# Patient Record
Sex: FEMALE | Race: WHITE | NOT HISPANIC OR LATINO | Employment: FULL TIME | URBAN - METROPOLITAN AREA
[De-identification: names, ages, dates, MRNs, and addresses within clinical notes are randomized per-mention and may not be internally consistent; named-entity substitution may affect disease eponyms.]

---

## 2017-01-09 ENCOUNTER — APPOINTMENT (OUTPATIENT)
Dept: GENERAL RADIOLOGY | Facility: HOSPITAL | Age: 52
End: 2017-01-09
Attending: FAMILY MEDICINE

## 2017-01-16 ENCOUNTER — HOSPITAL ENCOUNTER (OUTPATIENT)
Dept: GENERAL RADIOLOGY | Facility: HOSPITAL | Age: 52
Discharge: HOME | End: 2017-01-16
Attending: FAMILY MEDICINE | Admitting: FAMILY MEDICINE

## 2017-01-16 DIAGNOSIS — M25.511 ACUTE PAIN OF RIGHT SHOULDER: ICD-10-CM

## 2017-01-16 PROCEDURE — 73030 X-RAY EXAM OF SHOULDER: CPT

## 2017-11-06 ENCOUNTER — TRANSCRIBE ORDERS (OUTPATIENT)
Dept: ADMINISTRATIVE | Facility: HOSPITAL | Age: 52
End: 2017-11-06

## 2017-11-06 DIAGNOSIS — Z12.31 VISIT FOR SCREENING MAMMOGRAM: Primary | ICD-10-CM

## 2017-12-19 ENCOUNTER — HOSPITAL ENCOUNTER (OUTPATIENT)
Dept: MAMMOGRAPHY | Facility: HOSPITAL | Age: 52
Discharge: HOME | End: 2017-12-19
Attending: OBSTETRICS & GYNECOLOGY | Admitting: OBSTETRICS & GYNECOLOGY

## 2017-12-19 DIAGNOSIS — Z12.31 VISIT FOR SCREENING MAMMOGRAM: ICD-10-CM

## 2017-12-19 PROCEDURE — 77067 SCR MAMMO BI INCL CAD: CPT | Performed by: RADIOLOGY

## 2017-12-19 PROCEDURE — G0202 SCR MAMMO BI INCL CAD: HCPCS

## 2017-12-19 PROCEDURE — 77063 BREAST TOMOSYNTHESIS BI: CPT | Performed by: RADIOLOGY

## 2017-12-19 PROCEDURE — 77063 BREAST TOMOSYNTHESIS BI: CPT

## 2018-11-15 ENCOUNTER — TRANSCRIBE ORDERS (OUTPATIENT)
Dept: ADMINISTRATIVE | Facility: HOSPITAL | Age: 53
End: 2018-11-15

## 2018-11-15 DIAGNOSIS — Z12.31 VISIT FOR SCREENING MAMMOGRAM: Primary | ICD-10-CM

## 2018-12-19 ENCOUNTER — OFFICE VISIT (OUTPATIENT)
Dept: FAMILY MEDICINE CLINIC | Facility: CLINIC | Age: 53
End: 2018-12-19

## 2018-12-19 VITALS
HEIGHT: 62 IN | DIASTOLIC BLOOD PRESSURE: 80 MMHG | OXYGEN SATURATION: 99 % | WEIGHT: 138.38 LBS | BODY MASS INDEX: 25.47 KG/M2 | HEART RATE: 80 BPM | SYSTOLIC BLOOD PRESSURE: 120 MMHG | RESPIRATION RATE: 18 BRPM | TEMPERATURE: 98.8 F

## 2018-12-19 DIAGNOSIS — R10.30 LOWER ABDOMINAL PAIN: Primary | ICD-10-CM

## 2018-12-19 DIAGNOSIS — K57.92 DIVERTICULITIS: ICD-10-CM

## 2018-12-19 LAB
BILIRUB BLD-MCNC: NEGATIVE MG/DL
CLARITY, POC: CLEAR
COLOR UR: YELLOW
GLUCOSE UR STRIP-MCNC: NEGATIVE MG/DL
KETONES UR QL: NEGATIVE
LEUKOCYTE EST, POC: NEGATIVE
NITRITE UR-MCNC: NEGATIVE MG/ML
PH UR: 7 [PH] (ref 5–8)
PROT UR STRIP-MCNC: NEGATIVE MG/DL
RBC # UR STRIP: NEGATIVE /UL
SP GR UR: 1.02 (ref 1–1.03)
UROBILINOGEN UR QL: NORMAL

## 2018-12-19 PROCEDURE — 81003 URINALYSIS AUTO W/O SCOPE: CPT | Performed by: NURSE PRACTITIONER

## 2018-12-19 PROCEDURE — 99214 OFFICE O/P EST MOD 30 MIN: CPT | Performed by: NURSE PRACTITIONER

## 2018-12-19 RX ORDER — METRONIDAZOLE 500 MG/1
500 TABLET ORAL 3 TIMES DAILY
Qty: 21 TABLET | Refills: 0 | Status: SHIPPED | OUTPATIENT
Start: 2018-12-19 | End: 2019-02-13

## 2018-12-19 RX ORDER — CIPROFLOXACIN 750 MG/1
750 TABLET, FILM COATED ORAL 2 TIMES DAILY
Qty: 14 TABLET | Refills: 0 | Status: SHIPPED | OUTPATIENT
Start: 2018-12-19 | End: 2018-12-26

## 2018-12-19 NOTE — PATIENT INSTRUCTIONS
Diverticulitis  Diverticulitis is infection or inflammation of small pouches (diverticula) in the colon that form due to a condition called diverticulosis. Diverticula can trap stool (feces) and bacteria, causing infection and inflammation.  Diverticulitis may cause severe stomach pain and diarrhea. It may lead to tissue damage in the colon that causes bleeding. The diverticula may also burst (rupture) and cause infected stool to enter other areas of the abdomen.  Complications of diverticulitis can include:  · Bleeding.  · Severe infection.  · Severe pain.  · Rupture (perforation) of the colon.  · Blockage (obstruction) of the colon.    What are the causes?  This condition is caused by stool becoming trapped in the diverticula, which allows bacteria to grow in the diverticula. This leads to inflammation and infection.  What increases the risk?  You are more likely to develop this condition if:  · You have diverticulosis. The risk for diverticulosis increases if:  ? You are overweight or obese.  ? You use tobacco products.  ? You do not get enough exercise.  · You eat a diet that does not include enough fiber. High-fiber foods include fruits, vegetables, beans, nuts, and whole grains.    What are the signs or symptoms?  Symptoms of this condition may include:  · Pain and tenderness in the abdomen. The pain is normally located on the left side of the abdomen, but it may occur in other areas.  · Fever and chills.  · Bloating.  · Cramping.  · Nausea.  · Vomiting.  · Changes in bowel routines.  · Blood in your stool.    How is this diagnosed?  This condition is diagnosed based on:  · Your medical history.  · A physical exam.  · Tests to make sure there is nothing else causing your condition. These tests may include:  ? Blood tests.  ? Urine tests.  ? Imaging tests of the abdomen, including X-rays, ultrasounds, MRIs, or CT scans.    How is this treated?  Most cases of this condition are mild and can be treated at home.  Treatment may include:  · Taking over-the-counter pain medicines.  · Following a clear liquid diet.  · Taking antibiotic medicines by mouth.  · Rest.    More severe cases may need to be treated at a hospital. Treatment may include:  · Not eating or drinking.  · Taking prescription pain medicine.  · Receiving antibiotic medicines through an IV tube.  · Receiving fluids and nutrition through an IV tube.  · Surgery.    When your condition is under control, your health care provider may recommend that you have a colonoscopy. This is an exam to look at the entire large intestine. During the exam, a lubricated, bendable tube is inserted into the anus and then passed into the rectum, colon, and other parts of the large intestine. A colonoscopy can show how severe your diverticula are and whether something else may be causing your symptoms.  Follow these instructions at home:  Medicines  · Take over-the-counter and prescription medicines only as told by your health care provider. These include fiber supplements, probiotics, and stool softeners.  · If you were prescribed an antibiotic medicine, take it as told by your health care provider. Do not stop taking the antibiotic even if you start to feel better.  · Do not drive or use heavy machinery while taking prescription pain medicine.  General instructions  · Follow a full liquid diet or another diet as directed by your health care provider. After your symptoms improve, your health care provider may tell you to change your diet. He or she may recommend that you eat a diet that contains at least 25 g (25 grams) of fiber daily. Fiber makes it easier to pass stool. Healthy sources of fiber include:  ? Berries. One cup contains 4-8 grams of fiber.  ? Beans or lentils. One half cup contains 5-8 grams of fiber.  ? Green vegetables. One cup contains 4 grams of fiber.  · Exercise for at least 30 minutes, 3 times each week. You should exercise hard enough to raise your heart rate and  break a sweat.  · Keep all follow-up visits as told by your health care provider. This is important. You may need a colonoscopy.  Contact a health care provider if:  · Your pain does not improve.  · You have a hard time drinking or eating food.  · Your bowel movements do not return to normal.  Get help right away if:  · Your pain gets worse.  · Your symptoms do not get better with treatment.  · Your symptoms suddenly get worse.  · You have a fever.  · You vomit more than one time.  · You have stools that are bloody, black, or tarry.  Summary  · Diverticulitis is infection or inflammation of small pouches (diverticula) in the colon that form due to a condition called diverticulosis. Diverticula can trap stool (feces) and bacteria, causing infection and inflammation.  · You are at higher risk for this condition if you have diverticulosis and you eat a diet that does not include enough fiber.  · Most cases of this condition are mild and can be treated at home. More severe cases may need to be treated at a hospital.  · When your condition is under control, your health care provider may recommend that you have an exam called a colonoscopy. This exam can show how severe your diverticula are and whether something else may be causing your symptoms.  This information is not intended to replace advice given to you by your health care provider. Make sure you discuss any questions you have with your health care provider.  Document Released: 09/27/2006 Document Revised: 01/20/2018 Document Reviewed: 01/20/2018  [employer] Interactive Patient Education © 2018 [employer]    Abdominal Pain, Adult  Abdominal pain can be caused by many things. Often, abdominal pain is not serious and it gets better with no treatment or by being treated at home. However, sometimes abdominal pain is serious. Your health care provider will do a medical history and a physical exam to try to determine the cause of your abdominal pain.  Follow these  instructions at home:  · Take over-the-counter and prescription medicines only as told by your health care provider. Do not take a laxative unless told by your health care provider.  · Drink enough fluid to keep your urine clear or pale yellow.  · Watch your condition for any changes.  · Keep all follow-up visits as told by your health care provider. This is important.  Contact a health care provider if:  · Your abdominal pain changes or gets worse.  · You are not hungry or you lose weight without trying.  · You are constipated or have diarrhea for more than 2-3 days.  · You have pain when you urinate or have a bowel movement.  · Your abdominal pain wakes you up at night.  · Your pain gets worse with meals, after eating, or with certain foods.  · You are throwing up and cannot keep anything down.  · You have a fever.  Get help right away if:  · Your pain does not go away as soon as your health care provider told you to expect.  · You cannot stop throwing up.  · Your pain is only in areas of the abdomen, such as the right side or the left lower portion of the abdomen.  · You have bloody or black stools, or stools that look like tar.  · You have severe pain, cramping, or bloating in your abdomen.  · You have signs of dehydration, such as:  ? Dark urine, very little urine, or no urine.  ? Cracked lips.  ? Dry mouth.  ? Sunken eyes.  ? Sleepiness.  ? Weakness.  This information is not intended to replace advice given to you by your health care provider. Make sure you discuss any questions you have with your health care provider.  Document Released: 09/27/2006 Document Revised: 07/07/2017 Document Reviewed: 05/31/2017  [employer] Interactive Patient Education © 2018 [employer]  Ciprofloxacin tablets  What is this medicine?  CIPROFLOXACIN (sip obie FLOX a sin) is a quinolone antibiotic. It is used to treat certain kinds of bacterial infections. It will not work for colds, flu, or other viral infections.  This medicine may  be used for other purposes; ask your health care provider or pharmacist if you have questions.  COMMON BRAND NAME(S): Cipro  What should I tell my health care provider before I take this medicine?  They need to know if you have any of these conditions:  -bone problems  -history of low levels of potassium in the blood  -joint problems  -irregular heartbeat  -kidney disease  -myasthenia gravis  -seizures  -tendon problems  -tingling of the fingers or toes, or other nerve disorder  -an unusual or allergic reaction to ciprofloxacin, other antibiotics or medicines, foods, dyes, or preservatives  -pregnant or trying to get pregnant  -breast-feeding  How should I use this medicine?  Take this medicine by mouth with a glass of water. Follow the directions on the prescription label. Take your medicine at regular intervals. Do not take your medicine more often than directed. Take all of your medicine as directed even if you think your are better. Do not skip doses or stop your medicine early.  You can take this medicine with food or on an empty stomach. It can be taken with a meal that contains dairy or calcium, but do not take it alone with a dairy product, like milk or yogurt or calcium-fortified juice.  A special MedGuide will be given to you by the pharmacist with each prescription and refill. Be sure to read this information carefully each time.  Talk to your pediatrician regarding the use of this medicine in children. Special care may be needed.  Overdosage: If you think you have taken too much of this medicine contact a poison control center or emergency room at once.  NOTE: This medicine is only for you. Do not share this medicine with others.  What if I miss a dose?  If you miss a dose, take it as soon as you can. If it is almost time for your next dose, take only that dose. Do not take double or extra doses.  What may interact with this medicine?  Do not take this medicine with any of the following  medications:  -cisapride  -dofetilide  -dronedarone  -flibanserin  -lomitapide  -pimozide  -thioridazine  -tizanidine  -ziprasidone  This medicine may also interact with the following medications:  -antacids  -birth control pills  -caffeine  -certain medicines for diabetes, like glipizide or glyburide  -certain medicines that treat or prevent blood clots like warfarin  -clozapine  -cyclosporine  -didanosine (ddI) buffered tablets or powder  -duloxetine  -lanthanum carbonate  -lidocaine  -methotrexate  -multivitamins  -NSAIDS, medicines for pain and inflammation, like ibuprofen or naproxen  -olanzapine  -omeprazole  -other medicines that prolong the QT interval (cause an abnormal heart rhythm)  -phenytoin  -probenecid  -ropinirole  -sevelamer  -sildenafil  -sucralfate  -theophylline  -zolpidem  This list may not describe all possible interactions. Give your health care provider a list of all the medicines, herbs, non-prescription drugs, or dietary supplements you use. Also tell them if you smoke, drink alcohol, or use illegal drugs. Some items may interact with your medicine.  What should I watch for while using this medicine?  Tell your doctor or health care professional if your symptoms do not improve.  Do not treat diarrhea with over the counter products. Contact your doctor if you have diarrhea that lasts more than 2 days or if it is severe and watery.  You may get drowsy or dizzy. Do not drive, use machinery, or do anything that needs mental alertness until you know how this medicine affects you. Do not stand or sit up quickly, especially if you are an older patient. This reduces the risk of dizzy or fainting spells.  This medicine can make you more sensitive to the sun. Keep out of the sun. If you cannot avoid being in the sun, wear protective clothing and use sunscreen. Do not use sun lamps or tanning beds/booths.  Avoid antacids, aluminum, calcium, iron, magnesium, and zinc products for 6 hours before and 2  hours after taking a dose of this medicine.  What side effects may I notice from receiving this medicine?  Side effects that you should report to your doctor or health care professional as soon as possible:  -allergic reactions like skin rash or hives, swelling of the face, lips, or tongue  -anxious  -confusion  -depressed mood  -diarrhea  -fast, irregular heartbeat  -hallucination, loss of contact with reality  -joint, muscle, or tendon pain or swelling  -pain, tingling, numbness in the hands or feet  -suicidal thoughts or other mood changes  -sunburn  -unusually weak or tired  Side effects that usually do not require medical attention (report to your doctor or health care professional if they continue or are bothersome):  -dry mouth  -headache  -nausea  -trouble sleeping  This list may not describe all possible side effects. Call your doctor for medical advice about side effects. You may report side effects to FDA at 1-343-FDA-0712.  Where should I keep my medicine?  Keep out of the reach of children.  Store at room temperature below 30 degrees C (86 degrees F). Keep container tightly closed. Throw away any unused medicine after the expiration date.  NOTE: This sheet is a summary. It may not cover all possible information. If you have questions about this medicine, talk to your doctor, pharmacist, or health care provider.  © 2018 [employer] (2017-07-28 14:42:02)  Metronidazole tablets or capsules  What is this medicine?  METRONIDAZOLE (me troe NI da zole) is an antiinfective. It is used to treat certain kinds of bacterial and protozoal infections. It will not work for colds, flu, or other viral infections.  This medicine may be used for other purposes; ask your health care provider or pharmacist if you have questions.  COMMON BRAND NAME(S): Flagyl  What should I tell my health care provider before I take this medicine?  They need to know if you have any of these conditions:  -anemia or other blood  disorders  -disease of the nervous system  -fungal or yeast infection  -if you drink alcohol containing drinks  -liver disease  -seizures  -an unusual or allergic reaction to metronidazole, or other medicines, foods, dyes, or preservatives  -pregnant or trying to get pregnant  -breast-feeding  How should I use this medicine?  Take this medicine by mouth with a full glass of water. Follow the directions on the prescription label. Take your medicine at regular intervals. Do not take your medicine more often than directed. Take all of your medicine as directed even if you think you are better. Do not skip doses or stop your medicine early.  Talk to your pediatrician regarding the use of this medicine in children. Special care may be needed.  Overdosage: If you think you have taken too much of this medicine contact a poison control center or emergency room at once.  NOTE: This medicine is only for you. Do not share this medicine with others.  What if I miss a dose?  If you miss a dose, take it as soon as you can. If it is almost time for your next dose, take only that dose. Do not take double or extra doses.  What may interact with this medicine?  Do not take this medicine with any of the following medications:  -alcohol or any product that contains alcohol  -amprenavir oral solution  -cisapride  -disulfiram  -dofetilide  -dronedarone  -paclitaxel injection  -pimozide  -ritonavir oral solution  -sertraline oral solution  -sulfamethoxazole-trimethoprim injection  -thioridazine  -ziprasidone  This medicine may also interact with the following medications:  -birth control pills  -cimetidine  -lithium  -other medicines that prolong the QT interval (cause an abnormal heart rhythm)  -phenobarbital  -phenytoin  -warfarin  This list may not describe all possible interactions. Give your health care provider a list of all the medicines, herbs, non-prescription drugs, or dietary supplements you use. Also tell them if you smoke,  drink alcohol, or use illegal drugs. Some items may interact with your medicine.  What should I watch for while using this medicine?  Tell your doctor or health care professional if your symptoms do not improve or if they get worse.  You may get drowsy or dizzy. Do not drive, use machinery, or do anything that needs mental alertness until you know how this medicine affects you. Do not stand or sit up quickly, especially if you are an older patient. This reduces the risk of dizzy or fainting spells.  Avoid alcoholic drinks while you are taking this medicine and for three days afterward. Alcohol may make you feel dizzy, sick, or flushed.  If you are being treated for a sexually transmitted disease, avoid sexual contact until you have finished your treatment. Your sexual partner may also need treatment.  What side effects may I notice from receiving this medicine?  Side effects that you should report to your doctor or health care professional as soon as possible:  -allergic reactions like skin rash or hives, swelling of the face, lips, or tongue  -confusion, clumsiness  -difficulty speaking  -discolored or sore mouth  -dizziness  -fever, infection  -numbness, tingling, pain or weakness in the hands or feet  -trouble passing urine or change in the amount of urine  -redness, blistering, peeling or loosening of the skin, including inside the mouth  -seizures  -unusually weak or tired  -vaginal irritation, dryness, or discharge  Side effects that usually do not require medical attention (report to your doctor or health care professional if they continue or are bothersome):  -diarrhea  -headache  -irritability  -metallic taste  -nausea  -stomach pain or cramps  -trouble sleeping  This list may not describe all possible side effects. Call your doctor for medical advice about side effects. You may report side effects to FDA at 1-902-FDA-1898.  Where should I keep my medicine?  Keep out of the reach of children.  Store at room  temperature below 25 degrees C (77 degrees F). Protect from light. Keep container tightly closed. Throw away any unused medicine after the expiration date.  NOTE: This sheet is a summary. It may not cover all possible information. If you have questions about this medicine, talk to your doctor, pharmacist, or health care provider.  © 2018 [employer] (2014-07-25 14:08:39)

## 2018-12-21 NOTE — PROGRESS NOTES
Subjective   Merry Weller is a 53 y.o. female.     Abdominal Pain   This is a new problem. Episode onset: 3 days ago. The onset quality is gradual. The problem occurs daily. The problem has been gradually worsening. The pain is located in the LUQ, RLQ and periumbilical region. The pain is at a severity of 4/10. The quality of the pain is aching, cramping and a sensation of fullness. The abdominal pain does not radiate. Associated symptoms include diarrhea and nausea. Pertinent negatives include no dysuria, fever, hematochezia, hematuria, melena, myalgias or vomiting. The pain is aggravated by certain positions. She has tried nothing for the symptoms. Prior workup: colonoscopy 2012. Her past medical history is significant for GERD. Abdominal surgery: cholecystectomy. Diverticulitis       The following portions of the patient's history were reviewed and updated as appropriate: allergies, current medications, past family history, past medical history, past social history, past surgical history and problem list.     Allergies   Allergen Reactions   • Biaxin [Clarithromycin]    • Penicillins      Review of Systems   Constitutional: Negative for chills, diaphoresis, fatigue and fever.   Respiratory: Negative.    Cardiovascular: Negative.    Gastrointestinal: Positive for abdominal pain, diarrhea and nausea. Negative for blood in stool, hematochezia, melena and vomiting.   Genitourinary: Negative for dysuria, flank pain and hematuria.   Musculoskeletal: Negative for back pain and myalgias.   Skin: Negative for color change and rash.       Objective   Physical Exam   Constitutional: She is oriented to person, place, and time. Vital signs are normal. She appears well-developed and well-nourished. She is cooperative.  Non-toxic appearance. She does not have a sickly appearance. She does not appear ill. No distress.   HENT:   Mouth/Throat: Uvula is midline and mucous membranes are normal.   Cardiovascular: Normal rate,  regular rhythm and normal heart sounds.   Pulmonary/Chest: Effort normal and breath sounds normal.   Abdominal: Soft. Normal appearance and bowel sounds are normal. She exhibits no distension and no mass. There is no hepatosplenomegaly. There is tenderness (mild) in the right lower quadrant, periumbilical area and left lower quadrant. There is no rigidity, no rebound, no guarding and no CVA tenderness.   Neurological: She is alert and oriented to person, place, and time.   Skin: Skin is warm, dry and intact. No rash noted. She is not diaphoretic.   Psychiatric: She has a normal mood and affect. Her behavior is normal. Judgment and thought content normal.   Vitals reviewed.    Vitals:    12/19/18 1838   BP: 120/80   Pulse: 80   Resp: 18   Temp: 98.8 °F (37.1 °C)   SpO2: 99%   Body mass index is 25.31 kg/m².     Assessment/Plan   Merry was seen today for abdominal pain.    Diagnoses and all orders for this visit:    Lower abdominal pain  -     POC Urinalysis Dipstick, Automated    Brief Urine Lab Results  (Last result in the past 365 days)      Color   Clarity   Blood   Leuk Est   Nitrite   Protein   CREAT   Urine HCG        12/19/18 1902 Yellow Clear Negative Negative Negative Negative             Diverticulitis  -     ciprofloxacin (CIPRO) 750 MG tablet; Take 1 tablet by mouth 2 (Two) Times a Day for 7 days.  -     metroNIDAZOLE (FLAGYL) 500 MG tablet; Take 1 tablet by mouth 3 (Three) Times a Day.     Discussed the nature of the medical condition(s) risks, complications, implications, management, safe and proper use of medications. Encouraged medication compliance, and keeping scheduled follow up appointments with me and any other providers.     RTC if symptoms fail to improve, to ER if symptoms worsen.

## 2019-01-10 ENCOUNTER — HOSPITAL ENCOUNTER (OUTPATIENT)
Dept: MAMMOGRAPHY | Facility: HOSPITAL | Age: 54
Discharge: HOME | End: 2019-01-10
Attending: OBSTETRICS & GYNECOLOGY | Admitting: OBSTETRICS & GYNECOLOGY

## 2019-01-10 DIAGNOSIS — Z12.31 VISIT FOR SCREENING MAMMOGRAM: ICD-10-CM

## 2019-01-10 PROCEDURE — 77067 SCR MAMMO BI INCL CAD: CPT

## 2019-01-10 PROCEDURE — 77063 BREAST TOMOSYNTHESIS BI: CPT

## 2019-01-10 PROCEDURE — 77067 SCR MAMMO BI INCL CAD: CPT | Performed by: RADIOLOGY

## 2019-01-10 PROCEDURE — 77063 BREAST TOMOSYNTHESIS BI: CPT | Performed by: RADIOLOGY

## 2019-02-13 ENCOUNTER — OFFICE VISIT (OUTPATIENT)
Dept: FAMILY MEDICINE CLINIC | Facility: CLINIC | Age: 54
End: 2019-02-13

## 2019-02-13 VITALS
HEART RATE: 111 BPM | RESPIRATION RATE: 22 BRPM | TEMPERATURE: 98.7 F | WEIGHT: 139.4 LBS | OXYGEN SATURATION: 98 % | HEIGHT: 62 IN | SYSTOLIC BLOOD PRESSURE: 106 MMHG | DIASTOLIC BLOOD PRESSURE: 60 MMHG | BODY MASS INDEX: 25.65 KG/M2

## 2019-02-13 DIAGNOSIS — Z00.00 ANNUAL PHYSICAL EXAM: Primary | ICD-10-CM

## 2019-02-13 DIAGNOSIS — Z23 NEED FOR HEPATITIS A VACCINATION: ICD-10-CM

## 2019-02-13 DIAGNOSIS — Z12.11 SCREEN FOR COLON CANCER: ICD-10-CM

## 2019-02-13 LAB
ALBUMIN SERPL-MCNC: 4.31 G/DL (ref 3.2–4.8)
ALBUMIN/GLOB SERPL: 2 G/DL (ref 1.5–2.5)
ALP SERPL-CCNC: 86 U/L (ref 25–100)
ALT SERPL W P-5'-P-CCNC: 23 U/L (ref 7–40)
ANION GAP SERPL CALCULATED.3IONS-SCNC: 7 MMOL/L (ref 3–11)
ARTICHOKE IGE QN: 99 MG/DL (ref 0–130)
AST SERPL-CCNC: 23 U/L (ref 0–33)
BASOPHILS # BLD AUTO: 0.02 10*3/MM3 (ref 0–0.2)
BASOPHILS NFR BLD AUTO: 0.6 % (ref 0–1)
BILIRUB BLD-MCNC: NEGATIVE MG/DL
BILIRUB SERPL-MCNC: 0.4 MG/DL (ref 0.3–1.2)
BUN BLD-MCNC: 13 MG/DL (ref 9–23)
BUN/CREAT SERPL: 16.7 (ref 7–25)
CALCIUM SPEC-SCNC: 9.3 MG/DL (ref 8.7–10.4)
CHLORIDE SERPL-SCNC: 106 MMOL/L (ref 99–109)
CHOLEST SERPL-MCNC: 164 MG/DL (ref 0–200)
CLARITY, POC: CLEAR
CO2 SERPL-SCNC: 28 MMOL/L (ref 20–31)
COLOR UR: YELLOW
CREAT BLD-MCNC: 0.78 MG/DL (ref 0.6–1.3)
DEPRECATED RDW RBC AUTO: 48.5 FL (ref 37–54)
EOSINOPHIL # BLD AUTO: 0.02 10*3/MM3 (ref 0–0.3)
EOSINOPHIL NFR BLD AUTO: 0.6 % (ref 0–3)
ERYTHROCYTE [DISTWIDTH] IN BLOOD BY AUTOMATED COUNT: 16.3 % (ref 11.3–14.5)
GFR SERPL CREATININE-BSD FRML MDRD: 78 ML/MIN/1.73
GLOBULIN UR ELPH-MCNC: 2.2 GM/DL
GLUCOSE BLD-MCNC: 88 MG/DL (ref 70–100)
GLUCOSE UR STRIP-MCNC: NEGATIVE MG/DL
HCT VFR BLD AUTO: 38.8 % (ref 34.5–44)
HDLC SERPL-MCNC: 51 MG/DL (ref 40–60)
HGB BLD-MCNC: 11.9 G/DL (ref 11.5–15.5)
IMM GRANULOCYTES # BLD AUTO: 0.01 10*3/MM3 (ref 0–0.05)
IMM GRANULOCYTES NFR BLD AUTO: 0.3 % (ref 0–0.6)
KETONES UR QL: NEGATIVE
LEUKOCYTE EST, POC: ABNORMAL
LYMPHOCYTES # BLD AUTO: 1.34 10*3/MM3 (ref 0.6–4.8)
LYMPHOCYTES NFR BLD AUTO: 38.2 % (ref 24–44)
MCH RBC QN AUTO: 25 PG (ref 27–31)
MCHC RBC AUTO-ENTMCNC: 30.7 G/DL (ref 32–36)
MCV RBC AUTO: 81.5 FL (ref 80–99)
MONOCYTES # BLD AUTO: 0.29 10*3/MM3 (ref 0–1)
MONOCYTES NFR BLD AUTO: 8.3 % (ref 0–12)
NEUTROPHILS # BLD AUTO: 1.83 10*3/MM3 (ref 1.5–8.3)
NEUTROPHILS NFR BLD AUTO: 52 % (ref 41–71)
NITRITE UR-MCNC: NEGATIVE MG/ML
PH UR: 7 [PH] (ref 5–8)
PLATELET # BLD AUTO: 252 10*3/MM3 (ref 150–450)
PMV BLD AUTO: 10 FL (ref 6–12)
POTASSIUM BLD-SCNC: 4.5 MMOL/L (ref 3.5–5.5)
PROT SERPL-MCNC: 6.5 G/DL (ref 5.7–8.2)
PROT UR STRIP-MCNC: NEGATIVE MG/DL
RBC # BLD AUTO: 4.76 10*6/MM3 (ref 3.89–5.14)
RBC # UR STRIP: ABNORMAL /UL
SODIUM BLD-SCNC: 141 MMOL/L (ref 132–146)
SP GR UR: 1.02 (ref 1–1.03)
TRIGL SERPL-MCNC: 61 MG/DL (ref 0–150)
TSH SERPL DL<=0.05 MIU/L-ACNC: 2.28 MIU/ML (ref 0.35–5.35)
UROBILINOGEN UR QL: NORMAL
WBC NRBC COR # BLD: 3.51 10*3/MM3 (ref 3.5–10.8)

## 2019-02-13 PROCEDURE — 84443 ASSAY THYROID STIM HORMONE: CPT | Performed by: FAMILY MEDICINE

## 2019-02-13 PROCEDURE — 99396 PREV VISIT EST AGE 40-64: CPT | Performed by: FAMILY MEDICINE

## 2019-02-13 PROCEDURE — 81003 URINALYSIS AUTO W/O SCOPE: CPT | Performed by: FAMILY MEDICINE

## 2019-02-13 PROCEDURE — 80053 COMPREHEN METABOLIC PANEL: CPT | Performed by: FAMILY MEDICINE

## 2019-02-13 PROCEDURE — 90632 HEPA VACCINE ADULT IM: CPT | Performed by: FAMILY MEDICINE

## 2019-02-13 PROCEDURE — 90471 IMMUNIZATION ADMIN: CPT | Performed by: FAMILY MEDICINE

## 2019-02-13 PROCEDURE — 36415 COLL VENOUS BLD VENIPUNCTURE: CPT | Performed by: FAMILY MEDICINE

## 2019-02-13 PROCEDURE — 80061 LIPID PANEL: CPT | Performed by: FAMILY MEDICINE

## 2019-02-13 PROCEDURE — 85025 COMPLETE CBC W/AUTO DIFF WBC: CPT | Performed by: FAMILY MEDICINE

## 2019-02-13 NOTE — PROGRESS NOTES
"Subjective   Merry Weller is a 53 y.o. female seen today for Annual Exam.     History of Present Illness   The patient is here for a physical exam.  Last exam was in Dec 28 2016     She states she is doing well.   Denies any chest pain or shortness of breath.  BP today 106/60 on recheck.     She is employed.  She does not smoke.  Denies alcohol or drug use.    Sees her eye doctor each each year.  Sees dentist twice a year.  Sees Dr Rico for GYN exams last exam was August and mammograms. Normal mammogram on 01/10/19.  Sees Dermatology.     She had a negative colonoscopy in 2012 with Dr Vora. Repeat in 10 years. She has a history of diverticulitis. Had a flare up in December which has resolved.    She is up to date on immunizations. Had a Tdap vaccine in 2012.  Had a Flu vaccine at work.    The following portions of the patient's history were reviewed and updated as appropriate: allergies, current medications, past social history and problem list.    Review of Systems   Constitutional: Negative.    HENT: Negative.    Eyes: Negative.    Respiratory: Negative.    Cardiovascular: Negative.    Gastrointestinal: Positive for constipation. Negative for abdominal distention, abdominal pain, anal bleeding, blood in stool, diarrhea, nausea, rectal pain and vomiting.        Acid reflux     Endocrine: Positive for heat intolerance (occasional hot flash). Negative for cold intolerance, polydipsia, polyphagia and polyuria.   Genitourinary: Negative.    Musculoskeletal: Negative.    Skin: Negative.         Skin tags     Allergic/Immunologic: Negative.    Neurological: Negative.    Hematological: Negative.    Psychiatric/Behavioral: Negative.        Objective   /60   Pulse 111   Temp 98.7 °F (37.1 °C) (Temporal)   Resp 22   Ht 157.5 cm (62\")   Wt 63.2 kg (139 lb 6.4 oz)   SpO2 98%   BMI 25.50 kg/m²   Physical Exam   Constitutional: She is oriented to person, place, and time. She appears well-developed and " well-nourished. No distress.   HENT:   Head: Normocephalic and atraumatic.   Right Ear: External ear normal.   Left Ear: External ear normal.   Nose: Nose normal.   Mouth/Throat: Oropharynx is clear and moist.   Eyes: Conjunctivae and EOM are normal. Pupils are equal, round, and reactive to light.   Neck: Normal range of motion. Neck supple. No thyromegaly present.   Cardiovascular: Normal rate, regular rhythm, normal heart sounds and intact distal pulses.   Pulmonary/Chest: Effort normal and breath sounds normal. No respiratory distress. She has no wheezes. She has no rales.   Abdominal: Soft. Bowel sounds are normal. There is no tenderness.   Musculoskeletal: Normal range of motion.   Neurological: She is alert and oriented to person, place, and time. She has normal reflexes.   Skin: Skin is warm and dry. She is not diaphoretic.   Skin tags of the chest wall.     Psychiatric: She has a normal mood and affect. Her behavior is normal. Judgment and thought content normal.   Nursing note and vitals reviewed.      Assessment/Plan   Problem List Items Addressed This Visit     None      Visit Diagnoses     Annual physical exam    -  Primary    Relevant Orders    POCT urinalysis dipstick, automated (Completed)    Screen for colon cancer        Need for hepatitis A vaccination                  Drink plenty fluids.  Get plenty fiber in diet.  Use the Miralax as needed.    Continue medications as doing.    Check a UA,CBC,CMP,Lipids,and TSH. Report results by letter.    Given a Hep A vaccine.    Schedule for a screening colonoscopy.    Follow up in 1 year. Sooner if needed.          We discussed with the patient the importance of maintaining a healthy weight by observing a diet that is low in carbohydrates.  We also recommended avoiding consumption of high levels of sodium and caffeine to prevent hypertension. We recommended daily exercise and obtaining a weight with a BMI less than 26.  We also recommended avoiding the use  of tobacco and alcohol.  We also recommended an annual physical with their primary care physician.          Scribed for Dr Juaquin Wilson by Atiya Jefferson CMA.          I, Juaquin Wilson MD, personally performed the services described in this documentation, as scribed by Atiya Jefferson in my presence, and is both accurate and complete.        (Please note that portions of this note were completed with a voice recognition program. Efforts were made to edit the dictations,but occasionally words are mis transcribed.)

## 2019-06-10 ENCOUNTER — OFFICE VISIT (OUTPATIENT)
Dept: FAMILY MEDICINE CLINIC | Facility: CLINIC | Age: 54
End: 2019-06-10

## 2019-06-10 VITALS
BODY MASS INDEX: 25.58 KG/M2 | OXYGEN SATURATION: 98 % | RESPIRATION RATE: 20 BRPM | TEMPERATURE: 97.9 F | DIASTOLIC BLOOD PRESSURE: 80 MMHG | HEART RATE: 59 BPM | HEIGHT: 62 IN | WEIGHT: 139 LBS | SYSTOLIC BLOOD PRESSURE: 116 MMHG

## 2019-06-10 DIAGNOSIS — M70.71 BURSITIS OF RIGHT HIP, UNSPECIFIED BURSA: Primary | ICD-10-CM

## 2019-06-10 PROCEDURE — 20552 NJX 1/MLT TRIGGER POINT 1/2: CPT | Performed by: FAMILY MEDICINE

## 2019-06-10 PROCEDURE — 96372 THER/PROPH/DIAG INJ SC/IM: CPT | Performed by: FAMILY MEDICINE

## 2019-06-10 PROCEDURE — 99213 OFFICE O/P EST LOW 20 MIN: CPT | Performed by: FAMILY MEDICINE

## 2019-06-10 RX ORDER — METHYLPREDNISOLONE ACETATE 40 MG/ML
40 INJECTION, SUSPENSION INTRA-ARTICULAR; INTRALESIONAL; INTRAMUSCULAR; SOFT TISSUE ONCE
Status: COMPLETED | OUTPATIENT
Start: 2019-06-10 | End: 2019-06-10

## 2019-06-10 RX ADMIN — METHYLPREDNISOLONE ACETATE 40 MG: 40 INJECTION, SUSPENSION INTRA-ARTICULAR; INTRALESIONAL; INTRAMUSCULAR; SOFT TISSUE at 16:58

## 2019-06-10 NOTE — PROGRESS NOTES
"Subjective   Merry Weller is a 53 y.o. female seen today for Leg Pain.     Leg Pain    The incident occurred more than 1 week ago. There was no injury mechanism. The pain is present in the right thigh. The quality of the pain is described as aching. The pain is at a severity of 6/10. The pain has been fluctuating since onset. The symptoms are aggravated by movement and weight bearing. She has tried rest, NSAIDs and acetaminophen for the symptoms. The treatment provided mild relief.        The following portions of the patient's history were reviewed and updated as appropriate: allergies, current medications, past social history and problem list.    Review of Systems   Respiratory: Negative for shortness of breath.    Cardiovascular: Negative for chest pain.   Musculoskeletal: Positive for myalgias (right thigh.).       Objective   /80   Pulse 59   Temp 97.9 °F (36.6 °C)   Resp 20   Ht 157.5 cm (62\")   Wt 63 kg (139 lb)   SpO2 98%   BMI 25.42 kg/m²   Physical Exam   Constitutional: She appears well-developed and well-nourished.   Musculoskeletal: She exhibits tenderness (right thigh). She exhibits no edema.   Nursing note and vitals reviewed.      Assessment/Plan   Problem List Items Addressed This Visit     None      Visit Diagnoses     Bursitis of right hip, unspecified bursa    -  Primary      Inject Trigger Point, 1 or 2  Date/Time: 6/10/2019 4:34 PM  Performed by: Juaquin Wilson MD  Authorized by: Juaquin Wilson MD   Consent: Verbal consent obtained.  Consent given by: patient  Patient understanding: patient states understanding of the procedure being performed  Patient consent: the patient's understanding of the procedure matches consent given  Patient identity confirmed: verbally with patient  Local anesthesia used: yes    Anesthesia:  Local anesthesia used: yes  Local Anesthetic: bupivacaine 0.25% without epinephrine    Sedation:  Patient sedated: no    Patient tolerance: Patient " tolerated the procedure well with no immediate complications  Comments: Right hip injected with Depo medrol 40 mg and 1 CC Marcaine.                I, Juaquin Wilson MD, personally performed the services described in this documentation, as scribed by Atiya Jefferson in my presence, and is both accurate and complete.        (Please note that portions of this note were completed with a voice recognition program. Efforts were made to edit the dictations,but occasionally words are mis transcribed.)

## 2019-07-29 ENCOUNTER — TRANSCRIBE ORDERS (OUTPATIENT)
Dept: LAB | Facility: HOSPITAL | Age: 54
End: 2019-07-29

## 2019-07-29 ENCOUNTER — LAB (OUTPATIENT)
Dept: LAB | Facility: HOSPITAL | Age: 54
End: 2019-07-29

## 2019-07-29 DIAGNOSIS — N95.1 MENOPAUSAL SYMPTOMS: ICD-10-CM

## 2019-07-29 DIAGNOSIS — N95.1 MENOPAUSAL SYMPTOMS: Primary | ICD-10-CM

## 2019-07-29 LAB — FSH SERPL-ACNC: 123 MIU/ML

## 2019-07-29 PROCEDURE — 83001 ASSAY OF GONADOTROPIN (FSH): CPT

## 2019-07-29 PROCEDURE — 36415 COLL VENOUS BLD VENIPUNCTURE: CPT

## 2019-12-19 ENCOUNTER — TRANSCRIBE ORDERS (OUTPATIENT)
Dept: ADMINISTRATIVE | Facility: HOSPITAL | Age: 54
End: 2019-12-19

## 2019-12-19 DIAGNOSIS — Z12.31 VISIT FOR SCREENING MAMMOGRAM: Primary | ICD-10-CM

## 2020-02-12 ENCOUNTER — HOSPITAL ENCOUNTER (OUTPATIENT)
Dept: MAMMOGRAPHY | Facility: HOSPITAL | Age: 55
Discharge: HOME | End: 2020-02-12
Admitting: OBSTETRICS & GYNECOLOGY

## 2020-02-12 DIAGNOSIS — Z12.31 VISIT FOR SCREENING MAMMOGRAM: ICD-10-CM

## 2020-02-12 PROCEDURE — 77063 BREAST TOMOSYNTHESIS BI: CPT | Performed by: RADIOLOGY

## 2020-02-12 PROCEDURE — 77063 BREAST TOMOSYNTHESIS BI: CPT

## 2020-02-12 PROCEDURE — 77067 SCR MAMMO BI INCL CAD: CPT | Performed by: RADIOLOGY

## 2020-02-12 PROCEDURE — 77067 SCR MAMMO BI INCL CAD: CPT

## 2021-01-13 ENCOUNTER — TRANSCRIBE ORDERS (OUTPATIENT)
Dept: ADMINISTRATIVE | Facility: HOSPITAL | Age: 56
End: 2021-01-13

## 2021-01-13 DIAGNOSIS — Z12.31 VISIT FOR SCREENING MAMMOGRAM: Primary | ICD-10-CM

## 2021-01-20 ENCOUNTER — OFFICE VISIT (OUTPATIENT)
Dept: FAMILY MEDICINE CLINIC | Facility: CLINIC | Age: 56
End: 2021-01-20

## 2021-01-20 VITALS
OXYGEN SATURATION: 98 % | BODY MASS INDEX: 27.05 KG/M2 | WEIGHT: 147 LBS | RESPIRATION RATE: 15 BRPM | DIASTOLIC BLOOD PRESSURE: 86 MMHG | TEMPERATURE: 97.7 F | HEART RATE: 89 BPM | SYSTOLIC BLOOD PRESSURE: 126 MMHG | HEIGHT: 62 IN

## 2021-01-20 DIAGNOSIS — Z00.00 ANNUAL PHYSICAL EXAM: Primary | ICD-10-CM

## 2021-01-20 DIAGNOSIS — Z11.59 NEED FOR HEPATITIS C SCREENING TEST: ICD-10-CM

## 2021-01-20 DIAGNOSIS — Z23 NEED FOR HEPATITIS A VACCINATION: ICD-10-CM

## 2021-01-20 DIAGNOSIS — S39.012A LUMBAR STRAIN, INITIAL ENCOUNTER: ICD-10-CM

## 2021-01-20 DIAGNOSIS — Z13.6 SCREENING FOR CARDIOVASCULAR CONDITION: ICD-10-CM

## 2021-01-20 DIAGNOSIS — M76.61 ACHILLES TENDINITIS OF BOTH LOWER EXTREMITIES: ICD-10-CM

## 2021-01-20 DIAGNOSIS — M76.62 ACHILLES TENDINITIS OF BOTH LOWER EXTREMITIES: ICD-10-CM

## 2021-01-20 LAB
ALBUMIN SERPL-MCNC: 4.4 G/DL (ref 3.5–5.2)
ALBUMIN/GLOB SERPL: 1.5 G/DL
ALP SERPL-CCNC: 104 U/L (ref 39–117)
ALT SERPL W P-5'-P-CCNC: 23 U/L (ref 1–33)
ANION GAP SERPL CALCULATED.3IONS-SCNC: 8.8 MMOL/L (ref 5–15)
AST SERPL-CCNC: 20 U/L (ref 1–32)
BASOPHILS # BLD AUTO: 0.03 10*3/MM3 (ref 0–0.2)
BASOPHILS NFR BLD AUTO: 0.6 % (ref 0–1.5)
BILIRUB BLD-MCNC: NEGATIVE MG/DL
BILIRUB SERPL-MCNC: 0.4 MG/DL (ref 0–1.2)
BUN SERPL-MCNC: 12 MG/DL (ref 6–20)
BUN/CREAT SERPL: 16.9 (ref 7–25)
CALCIUM SPEC-SCNC: 9.6 MG/DL (ref 8.6–10.5)
CHLORIDE SERPL-SCNC: 103 MMOL/L (ref 98–107)
CHOLEST SERPL-MCNC: 191 MG/DL (ref 0–200)
CLARITY, POC: CLEAR
CO2 SERPL-SCNC: 28.2 MMOL/L (ref 22–29)
COLOR UR: YELLOW
CREAT SERPL-MCNC: 0.71 MG/DL (ref 0.57–1)
DEPRECATED RDW RBC AUTO: 44.6 FL (ref 37–54)
EOSINOPHIL # BLD AUTO: 0.05 10*3/MM3 (ref 0–0.4)
EOSINOPHIL NFR BLD AUTO: 1 % (ref 0.3–6.2)
ERYTHROCYTE [DISTWIDTH] IN BLOOD BY AUTOMATED COUNT: 14.3 % (ref 12.3–15.4)
EXPIRATION DATE: NORMAL
GFR SERPL CREATININE-BSD FRML MDRD: 86 ML/MIN/1.73
GLOBULIN UR ELPH-MCNC: 3 GM/DL
GLUCOSE SERPL-MCNC: 84 MG/DL (ref 65–99)
GLUCOSE UR STRIP-MCNC: NEGATIVE MG/DL
HCT VFR BLD AUTO: 40.4 % (ref 34–46.6)
HCV AB SER DONR QL: NORMAL
HDLC SERPL-MCNC: 56 MG/DL (ref 40–60)
HGB BLD-MCNC: 13.5 G/DL (ref 12–15.9)
IMM GRANULOCYTES # BLD AUTO: 0.01 10*3/MM3 (ref 0–0.05)
IMM GRANULOCYTES NFR BLD AUTO: 0.2 % (ref 0–0.5)
KETONES UR QL: NEGATIVE
LDLC SERPL CALC-MCNC: 112 MG/DL (ref 0–100)
LDLC/HDLC SERPL: 1.95 {RATIO}
LEUKOCYTE EST, POC: NEGATIVE
LYMPHOCYTES # BLD AUTO: 1.77 10*3/MM3 (ref 0.7–3.1)
LYMPHOCYTES NFR BLD AUTO: 34.7 % (ref 19.6–45.3)
Lab: 5042
MCH RBC QN AUTO: 29 PG (ref 26.6–33)
MCHC RBC AUTO-ENTMCNC: 33.4 G/DL (ref 31.5–35.7)
MCV RBC AUTO: 86.9 FL (ref 79–97)
MONOCYTES # BLD AUTO: 0.37 10*3/MM3 (ref 0.1–0.9)
MONOCYTES NFR BLD AUTO: 7.3 % (ref 5–12)
NEUTROPHILS NFR BLD AUTO: 2.87 10*3/MM3 (ref 1.7–7)
NEUTROPHILS NFR BLD AUTO: 56.2 % (ref 42.7–76)
NITRITE UR-MCNC: NEGATIVE MG/ML
NRBC BLD AUTO-RTO: 0.2 /100 WBC (ref 0–0.2)
PH UR: 6 [PH] (ref 5–8)
PLATELET # BLD AUTO: 245 10*3/MM3 (ref 140–450)
PMV BLD AUTO: 10 FL (ref 6–12)
POTASSIUM SERPL-SCNC: 4 MMOL/L (ref 3.5–5.2)
PROT SERPL-MCNC: 7.4 G/DL (ref 6–8.5)
PROT UR STRIP-MCNC: NEGATIVE MG/DL
RBC # BLD AUTO: 4.65 10*6/MM3 (ref 3.77–5.28)
RBC # UR STRIP: NEGATIVE /UL
SODIUM SERPL-SCNC: 140 MMOL/L (ref 136–145)
SP GR UR: NORMAL
TRIGL SERPL-MCNC: 130 MG/DL (ref 0–150)
TSH SERPL DL<=0.05 MIU/L-ACNC: 2.15 UIU/ML (ref 0.27–4.2)
UROBILINOGEN UR QL: NORMAL
VLDLC SERPL-MCNC: 23 MG/DL (ref 5–40)
WBC # BLD AUTO: 5.1 10*3/MM3 (ref 3.4–10.8)

## 2021-01-20 PROCEDURE — 86803 HEPATITIS C AB TEST: CPT | Performed by: FAMILY MEDICINE

## 2021-01-20 PROCEDURE — 85025 COMPLETE CBC W/AUTO DIFF WBC: CPT | Performed by: FAMILY MEDICINE

## 2021-01-20 PROCEDURE — 99396 PREV VISIT EST AGE 40-64: CPT | Performed by: FAMILY MEDICINE

## 2021-01-20 PROCEDURE — 80053 COMPREHEN METABOLIC PANEL: CPT | Performed by: FAMILY MEDICINE

## 2021-01-20 PROCEDURE — 81003 URINALYSIS AUTO W/O SCOPE: CPT | Performed by: FAMILY MEDICINE

## 2021-01-20 PROCEDURE — 80061 LIPID PANEL: CPT | Performed by: FAMILY MEDICINE

## 2021-01-20 PROCEDURE — 90471 IMMUNIZATION ADMIN: CPT | Performed by: FAMILY MEDICINE

## 2021-01-20 PROCEDURE — 84443 ASSAY THYROID STIM HORMONE: CPT | Performed by: FAMILY MEDICINE

## 2021-01-20 PROCEDURE — 93000 ELECTROCARDIOGRAM COMPLETE: CPT | Performed by: FAMILY MEDICINE

## 2021-01-20 PROCEDURE — 90632 HEPA VACCINE ADULT IM: CPT | Performed by: FAMILY MEDICINE

## 2021-01-20 RX ORDER — MULTIPLE VITAMINS W/ MINERALS TAB 9MG-400MCG
1 TAB ORAL DAILY
COMMUNITY

## 2021-01-20 NOTE — PROGRESS NOTES
Subjective   Merry Weller is a 55 y.o. female seen today for Annual Exam.     History of Present Illness   The patient is here for a physical exam.    States she is doing good.  Denies any chest pain or shortness of breath.  States she will have an occasional pain in the left chest/breast. Sharp pain that will last a few seconds.  BP today is 126/86. 118/84 on recheck.    States she is having some right elbow pain. Using a band and does help some. Some tingling in all 10 fingers.  Also c/o pain in both achilles tendons x 3 weeks and right lower back x 2-3 days.    Working from home at the time due to Covid 19.  Does not smoke.  Denies alcohol or drug use.    Sees an eye doctor.   Sees a dentist.  Sees GYN - Dr Rico.    Last colonoscopy was 03/18/2019 with Dr Vora. Diverticulosis Recommend a repeat  screening colonoscopy in 10 years from that time.    Immunizations are up to date.      The following portions of the patient's history were reviewed and updated as appropriate: allergies, current medications, past social history and problem list.    Review of Systems   Constitutional: Negative.    HENT: Positive for tinnitus (occasional). Negative for congestion, dental problem, drooling, ear discharge, ear pain, facial swelling, hearing loss, mouth sores, nosebleeds, postnasal drip, rhinorrhea, sinus pressure, sinus pain, sneezing, sore throat, trouble swallowing and voice change.    Eyes: Negative.    Respiratory: Negative.    Cardiovascular: Negative.    Gastrointestinal: Positive for blood in stool (occasional with constipation) and constipation. Negative for abdominal distention, abdominal pain, anal bleeding, diarrhea, nausea, rectal pain and vomiting.        Frequent reflux     Endocrine: Positive for heat intolerance (hot flashes). Negative for cold intolerance, polydipsia, polyphagia and polyuria.   Genitourinary: Negative.    Musculoskeletal: Positive for arthralgias (right elbow) and back pain (right  "lower back). Negative for gait problem, joint swelling, myalgias, neck pain and neck stiffness.   Skin: Negative.    Allergic/Immunologic: Negative.    Neurological: Positive for numbness (and tingling all 10 fingers) and headaches (occasional). Negative for dizziness, tremors, seizures, syncope, facial asymmetry, speech difficulty, weakness and light-headedness.   Hematological: Negative.    Psychiatric/Behavioral: Positive for sleep disturbance (occasional insomnia). Negative for agitation, behavioral problems, confusion, decreased concentration, dysphoric mood, hallucinations, self-injury and suicidal ideas. The patient is not nervous/anxious and is not hyperactive.        Objective   /86   Pulse 89   Temp 97.7 °F (36.5 °C)   Resp 15   Ht 157.5 cm (62\")   Wt 66.7 kg (147 lb)   SpO2 98%   BMI 26.89 kg/m²   Physical Exam  Vitals signs and nursing note reviewed.   Constitutional:       General: She is not in acute distress.     Appearance: She is well-developed. She is not diaphoretic.   HENT:      Head: Normocephalic and atraumatic.      Right Ear: Tympanic membrane, ear canal and external ear normal.      Left Ear: Tympanic membrane, ear canal and external ear normal.      Nose: Nose normal.      Mouth/Throat:      Mouth: Mucous membranes are moist.      Pharynx: Oropharynx is clear.   Eyes:      Conjunctiva/sclera: Conjunctivae normal.      Pupils: Pupils are equal, round, and reactive to light.   Neck:      Musculoskeletal: Normal range of motion and neck supple.      Thyroid: No thyromegaly.   Cardiovascular:      Rate and Rhythm: Normal rate and regular rhythm.      Heart sounds: Normal heart sounds.   Pulmonary:      Effort: Pulmonary effort is normal. No respiratory distress.      Breath sounds: Normal breath sounds. No wheezing or rales.   Abdominal:      General: Bowel sounds are normal.      Palpations: Abdomen is soft.      Tenderness: There is no abdominal tenderness.   Musculoskeletal: " Normal range of motion.         General: Tenderness (left elbow) present.   Skin:     General: Skin is warm and dry.   Neurological:      Mental Status: She is alert and oriented to person, place, and time.      Deep Tendon Reflexes: Reflexes are normal and symmetric.   Psychiatric:         Behavior: Behavior normal.         Thought Content: Thought content normal.         Judgment: Judgment normal.       ECG 12 Lead    Date/Time: 1/20/2021 9:54 AM  Performed by: Juaquin Wilson MD  Authorized by: Juaquin Wilson MD   Comparison: not compared with previous ECG   Rhythm: sinus bradycardia  Rate: bradycardic  BPM: 58  Conduction: conduction normal  ST Segments: ST segments normal  T Waves: T waves normal  QRS axis: normal  Other: no other findings    Clinical impression: normal ECG            Assessment/Plan   Problems Addressed this Visit     None      Visit Diagnoses     Annual physical exam    -  Primary    Relevant Orders    POCT urinalysis dipstick, automated    Achilles tendinitis of both lower extremities        Lumbar strain, initial encounter        Screening for cardiovascular condition        Need for hepatitis C screening test        Need for hepatitis A vaccination          Diagnoses       Codes Comments    Annual physical exam    -  Primary ICD-10-CM: Z00.00  ICD-9-CM: V70.0     Achilles tendinitis of both lower extremities     ICD-10-CM: M76.61, M76.62  ICD-9-CM: 726.71     Lumbar strain, initial encounter     ICD-10-CM: S39.012A  ICD-9-CM: 847.2     Screening for cardiovascular condition     ICD-10-CM: Z13.6  ICD-9-CM: V81.2     Need for hepatitis C screening test     ICD-10-CM: Z11.59  ICD-9-CM: V73.89     Need for hepatitis A vaccination     ICD-10-CM: Z23  ICD-9-CM: V05.3               Drink plenty fluids.  Use heat and stretching. OTC Aleve twice a day.    Try OTC Pepcid as needed for reflux.    Check a UA,Hep C ab,CBC,CMP,Lipids, and TSH. Report results by letter.    With regards to lab  results, patient is instructed to call the office if they have not received test results within 2 weeks time.    Check a bone density next year.    Follow up in 1 year. Sooner if needed.      We discussed with the patient the importance of maintaining a healthy weight by observing a diet that is low in carbohydrates.  We also recommended avoiding consumption of high levels of sodium and caffeine to prevent hypertension. We recommended daily exercise and obtaining a weight with a BMI less than 26.  We also recommended avoiding the use of tobacco and alcohol.  We also recommended an annual physical with their primary care physician.        Scribed for Dr Juaquin Wilson by Atiya Jefferson CMA.          I, Juaquin Wilson MD, personally performed the services described in this documentation, as scribed by Atiya Jefferson in my presence, and is both accurate and complete.        (Please note that portions of this note were completed with a voice recognition program. Efforts were made to edit the dictations,but occasionally words are mis transcribed.)

## 2021-02-22 ENCOUNTER — HOSPITAL ENCOUNTER (OUTPATIENT)
Dept: MAMMOGRAPHY | Facility: HOSPITAL | Age: 56
Discharge: HOME | End: 2021-02-22
Admitting: OBSTETRICS & GYNECOLOGY

## 2021-02-22 DIAGNOSIS — Z12.31 VISIT FOR SCREENING MAMMOGRAM: ICD-10-CM

## 2021-02-22 PROCEDURE — 77063 BREAST TOMOSYNTHESIS BI: CPT | Performed by: RADIOLOGY

## 2021-02-22 PROCEDURE — 77067 SCR MAMMO BI INCL CAD: CPT

## 2021-02-22 PROCEDURE — 77063 BREAST TOMOSYNTHESIS BI: CPT

## 2021-02-22 PROCEDURE — 77067 SCR MAMMO BI INCL CAD: CPT | Performed by: RADIOLOGY

## 2021-07-12 ENCOUNTER — OFFICE VISIT (OUTPATIENT)
Dept: FAMILY MEDICINE CLINIC | Facility: CLINIC | Age: 56
End: 2021-07-12

## 2021-07-12 VITALS
DIASTOLIC BLOOD PRESSURE: 76 MMHG | SYSTOLIC BLOOD PRESSURE: 102 MMHG | WEIGHT: 148.2 LBS | HEART RATE: 67 BPM | BODY MASS INDEX: 27.27 KG/M2 | OXYGEN SATURATION: 97 % | HEIGHT: 62 IN

## 2021-07-12 DIAGNOSIS — L24.7 IRRITANT CONTACT DERMATITIS DUE TO PLANTS, EXCEPT FOOD: Primary | ICD-10-CM

## 2021-07-12 PROCEDURE — 99213 OFFICE O/P EST LOW 20 MIN: CPT | Performed by: FAMILY MEDICINE

## 2021-07-12 RX ORDER — METHYLPREDNISOLONE 4 MG/1
TABLET ORAL
Qty: 1 EACH | Refills: 0 | Status: SHIPPED | OUTPATIENT
Start: 2021-07-12 | End: 2021-10-29

## 2021-07-12 NOTE — PROGRESS NOTES
"Chief Complaint  Rash ( skin and eye area )    Subjective          Merry Weller presents to [facility]  Rash    The patient presents today for a facial rash. The patient reports her eyes have been swelling and experiences pruritus. She complains of a rash that extends around her back to her bikini line. She denies working in the yard, but does admit being through the woods at [place] at [facility]. The patient has been using over-the-counter Cortizone-10, but notes this has not been particularly helpful in regard to the pruritus. She has been using an ice pack as well for relief.     She also complains of an issue with her Achilles tendon in her ankle. The patient states she has an appointment for this next month. The patient report she has been experiencing pain for 6 months. She is able to ambulate, but reports this is painful. Her pain is most noticeable when she is pushing off to get up or to ambulate up and down the stairs. She has purchased a brace but states this did not provider her much relief.     She is allergic to PENICILLIN and BIAXIN.     Objective   Vital Signs:   /76   Pulse 67   Ht 157.5 cm (62\")   Wt 67.2 kg (148 lb 3.2 oz)   SpO2 97%   BMI 27.11 kg/m²     Physical Exam  Vitals and nursing note reviewed.   Constitutional:       Appearance: Normal appearance. She is well-developed.   HENT:      Head: Normocephalic and atraumatic.   Eyes:      General: No scleral icterus.     Conjunctiva/sclera: Conjunctivae normal.      Pupils: Pupils are equal, round, and reactive to light.   Neck:      Thyroid: No thyromegaly.      Vascular: No carotid bruit.   Cardiovascular:      Rate and Rhythm: Normal rate and regular rhythm.   Pulmonary:      Effort: Pulmonary effort is normal.   Musculoskeletal:         General: Tenderness present. Normal range of motion.      Cervical back: Neck supple.      Comments: Tenderness of the left Achilles tendon bursa "   Skin:     General: Skin is warm and dry.      Findings: Rash present.      Comments: Swelling of the right upper eyelid contact type rash on the torso and upper chest area   Neurological:      General: No focal deficit present.      Mental Status: She is alert and oriented to person, place, and time.      Comments: No focal deficits no lateralizing signs   Psychiatric:         Behavior: Behavior is cooperative.        Result Review :                 Assessment and Plan    Diagnoses and all orders for this visit:    1. Irritant contact dermatitis due to plants, except food (Primary)  -     methylPREDNISolone (MEDROL) 4 MG dose pack; Take as directed on package instructions.  Dispense: 1 each; Refill: 0        Follow Up   No follow-ups on file.  Patient was given instructions and counseling regarding her condition or for health maintenance advice. Please see specific information pulled into the AVS if appropriate.     ATTESTATION:  This note has been created using Dragon [employer] and was completed in the EHR by Alyx Guerrero  Avoid overuse of the ankle wear supportive shoes and a heel pad, if symptoms persist return to clinic for further evaluation

## 2021-10-29 ENCOUNTER — OFFICE VISIT (OUTPATIENT)
Dept: FAMILY MEDICINE CLINIC | Facility: CLINIC | Age: 56
End: 2021-10-29

## 2021-10-29 ENCOUNTER — HOSPITAL ENCOUNTER (OUTPATIENT)
Dept: GENERAL RADIOLOGY | Facility: HOSPITAL | Age: 56
Discharge: HOME | End: 2021-10-29
Admitting: NURSE PRACTITIONER

## 2021-10-29 VITALS
RESPIRATION RATE: 18 BRPM | BODY MASS INDEX: 27.42 KG/M2 | TEMPERATURE: 98.2 F | WEIGHT: 149 LBS | OXYGEN SATURATION: 98 % | DIASTOLIC BLOOD PRESSURE: 76 MMHG | HEART RATE: 76 BPM | HEIGHT: 62 IN | SYSTOLIC BLOOD PRESSURE: 138 MMHG

## 2021-10-29 DIAGNOSIS — M54.50 ACUTE BILATERAL LOW BACK PAIN WITHOUT SCIATICA: Primary | ICD-10-CM

## 2021-10-29 DIAGNOSIS — R35.0 FREQUENCY OF URINATION: ICD-10-CM

## 2021-10-29 DIAGNOSIS — G89.29 CHRONIC LEFT SHOULDER PAIN: ICD-10-CM

## 2021-10-29 DIAGNOSIS — M25.512 CHRONIC LEFT SHOULDER PAIN: ICD-10-CM

## 2021-10-29 LAB
BILIRUB BLD-MCNC: NEGATIVE MG/DL
CLARITY, POC: CLEAR
COLOR UR: YELLOW
EXPIRATION DATE: NORMAL
GLUCOSE UR STRIP-MCNC: NEGATIVE MG/DL
KETONES UR QL: NEGATIVE
LEUKOCYTE EST, POC: NEGATIVE
Lab: NORMAL
NITRITE UR-MCNC: NEGATIVE MG/ML
PH UR: 5.5 [PH] (ref 5–8)
PROT UR STRIP-MCNC: NEGATIVE MG/DL
RBC # UR STRIP: NEGATIVE /UL
SP GR UR: 1.03 (ref 1–1.03)
UROBILINOGEN UR QL: NORMAL

## 2021-10-29 PROCEDURE — 99213 OFFICE O/P EST LOW 20 MIN: CPT | Performed by: NURSE PRACTITIONER

## 2021-10-29 PROCEDURE — 72100 X-RAY EXAM L-S SPINE 2/3 VWS: CPT

## 2021-10-29 PROCEDURE — 73030 X-RAY EXAM OF SHOULDER: CPT

## 2021-10-29 PROCEDURE — 81003 URINALYSIS AUTO W/O SCOPE: CPT | Performed by: NURSE PRACTITIONER

## 2021-10-29 RX ORDER — BACLOFEN 10 MG/1
10 TABLET ORAL 3 TIMES DAILY
Qty: 90 TABLET | Refills: 0 | Status: SHIPPED | OUTPATIENT
Start: 2021-10-29 | End: 2023-03-13

## 2021-10-29 RX ORDER — PREDNISONE 10 MG/1
TABLET ORAL
Qty: 1 EACH | Refills: 0 | Status: SHIPPED | OUTPATIENT
Start: 2021-10-29 | End: 2022-01-06

## 2021-10-29 RX ORDER — IBUPROFEN 800 MG/1
800 TABLET ORAL EVERY 8 HOURS PRN
Qty: 90 TABLET | Refills: 0 | Status: SHIPPED | OUTPATIENT
Start: 2021-10-29 | End: 2022-08-23

## 2021-11-03 PROBLEM — G89.29 CHRONIC LEFT SHOULDER PAIN: Status: ACTIVE | Noted: 2021-11-03

## 2021-11-03 PROBLEM — R35.0 FREQUENCY OF URINATION: Status: ACTIVE | Noted: 2021-11-03

## 2021-11-03 PROBLEM — M54.50 ACUTE BILATERAL LOW BACK PAIN WITHOUT SCIATICA: Status: ACTIVE | Noted: 2021-11-03

## 2021-11-03 PROBLEM — M25.512 CHRONIC LEFT SHOULDER PAIN: Status: ACTIVE | Noted: 2021-11-03

## 2021-11-03 NOTE — ASSESSMENT & PLAN NOTE
X-ray ordered  Start Baclofen PRN  Heat/Ice therapy  Ibuprofen PRN as directed  Start steroid dose pack

## 2021-11-03 NOTE — ASSESSMENT & PLAN NOTE
X-ray of lumbar spine ordered  Start Baclofen PRN  Heat/Ice therapy  Ibuprofen PRN as directed  Start steroid dose pack  Lumbar exercises

## 2022-01-06 ENCOUNTER — TELEMEDICINE (OUTPATIENT)
Dept: FAMILY MEDICINE CLINIC | Facility: TELEHEALTH | Age: 57
End: 2022-01-06

## 2022-01-06 DIAGNOSIS — J20.9 ACUTE BRONCHITIS, UNSPECIFIED ORGANISM: Primary | ICD-10-CM

## 2022-01-06 PROBLEM — R05.9 COUGHING: Status: ACTIVE | Noted: 2021-12-29

## 2022-01-06 PROBLEM — M54.50 LOW BACK PAIN: Status: ACTIVE | Noted: 2019-10-25

## 2022-01-06 PROBLEM — R79.0 ABNORMAL LEVEL OF BLOOD MINERAL: Status: ACTIVE | Noted: 2018-11-09

## 2022-01-06 PROBLEM — Z13.9 ENCOUNTER FOR SCREENING, UNSPECIFIED: Status: ACTIVE | Noted: 2018-11-06

## 2022-01-06 PROBLEM — M25.551 PAIN IN RIGHT HIP: Status: ACTIVE | Noted: 2019-10-25

## 2022-01-06 PROBLEM — R35.0 FREQUENCY OF URINATION: Status: RESOLVED | Noted: 2021-11-03 | Resolved: 2022-01-06

## 2022-01-06 PROCEDURE — 99213 OFFICE O/P EST LOW 20 MIN: CPT | Performed by: NURSE PRACTITIONER

## 2022-01-06 RX ORDER — AZITHROMYCIN 250 MG/1
TABLET, FILM COATED ORAL
Qty: 6 TABLET | Refills: 0 | Status: SHIPPED | OUTPATIENT
Start: 2022-01-06 | End: 2022-05-18

## 2022-01-06 RX ORDER — METHYLPREDNISOLONE 4 MG/1
TABLET ORAL
Qty: 21 TABLET | Refills: 0 | Status: SHIPPED | OUTPATIENT
Start: 2022-01-06 | End: 2022-05-18

## 2022-01-06 RX ORDER — BENZONATATE 200 MG/1
200 CAPSULE ORAL 3 TIMES DAILY PRN
Qty: 20 CAPSULE | Refills: 0 | Status: SHIPPED | OUTPATIENT
Start: 2022-01-06 | End: 2022-05-18

## 2022-01-06 NOTE — PROGRESS NOTES
You have chosen to receive care through a telehealth visit.  Do you consent to use a video/audio connection for your medical care today? Yes     CHIEF COMPLAINT  Chief Complaint   Patient presents with   • Cough         HPI  Merry Weller is a 56 y.o. female  presents with complaint of cough for 9 days. This started after a cold. Negative for COVID-19, but  did test positive.   She cannot take the Mucinex DM as it makes her feel jittery and she does not like that feeling.   She has taken azithromycin for her bronchitis in the past and does not cause rash like the Biaxin.     Review of Systems   Constitutional: Negative for chills, diaphoresis, fatigue and fever.   HENT: Positive for postnasal drip. Negative for congestion, rhinorrhea (mild ), sinus pressure, sinus pain, sneezing and sore throat.    Respiratory: Positive for cough (productive cough clear to green) and chest tightness. Negative for shortness of breath and wheezing.    Cardiovascular: Negative for chest pain.   Gastrointestinal: Negative for diarrhea, nausea and vomiting.   Musculoskeletal: Negative for myalgias.   Neurological: Negative for headaches.   Hematological: Negative for adenopathy.       Past Medical History:   Diagnosis Date   • Ankle injury    • Hematoma of right thigh    • Left tennis elbow    • Sinusitis    • Tendonitis, bicipital        Family History   Problem Relation Age of Onset   • Hypertension Mother    • Osteoporosis Mother    • Endometriosis Mother    • Skin cancer Father    • Breast cancer Maternal Aunt 52   • Coronary artery disease Maternal Grandmother    • Coronary artery disease Paternal Grandmother    • Breast cancer Cousin 42   • Prostate cancer Maternal Uncle    • Esophageal cancer Maternal Uncle    • Ovarian cancer Neg Hx        Social History     Socioeconomic History   • Marital status:    Tobacco Use   • Smoking status: Never Smoker   • Smokeless tobacco: Never Used   Vaping Use   • Vaping Use:  Never used   Substance and Sexual Activity   • Alcohol use: No   • Drug use: No         There were no vitals taken for this visit.    PHYSICAL EXAM  Physical Exam   Constitutional: She is oriented to person, place, and time. She appears well-developed and well-nourished. She does not have a sickly appearance. She does not appear ill. No distress.   HENT:   Head: Normocephalic and atraumatic.   Eyes: EOM are normal.   Neck: Neck normal appearance.  Pulmonary/Chest: Effort normal.  No respiratory distress.  Neurological: She is alert and oriented to person, place, and time.   Skin: Skin is dry.   Psychiatric: She has a normal mood and affect.         Diagnoses and all orders for this visit:    1. Acute bronchitis, unspecified organism (Primary)    Other orders  -     azithromycin (Zithromax Z-Kendall) 250 MG tablet; Take 2 tablets by mouth on day 1, then 1 tablet daily on days 2-5  Dispense: 6 tablet; Refill: 0  -     methylPREDNISolone (MEDROL) 4 MG dose pack; Take as directed on package instructions.  Dispense: 21 tablet; Refill: 0  -     benzonatate (TESSALON) 200 MG capsule; Take 1 capsule by mouth 3 (Three) Times a Day As Needed for Cough.  Dispense: 20 capsule; Refill: 0          FOLLOW-UP  As discussed during visit with PCP/[facility] if no improvement or Urgent Care/Emergency Department if worsening of symptoms    Patient verbalizes understanding of medication dosage, comfort measures, instructions for treatment and follow-up.    Odessa Khan, LUIS ANTONIO  01/06/2022  13:10 EST    This visit was performed via Telehealth.  This patient has been instructed to follow-up with their primary care provider if their symptoms worsen or the treatment provided does not resolve their illness.

## 2022-01-06 NOTE — PATIENT INSTRUCTIONS
Drink plenty of water  If you fee like your are wheezing send me a message and we can start the albuterol inhaler, but that may make you feel jittery too.   If symptoms do not improve in 3-5 days follow up with your primary care provider or urgent care  If symptoms worsen follow up with urgent care or the emergency room       Acute Bronchitis, Adult    Acute bronchitis is when air tubes in the lungs (bronchi) suddenly get swollen. The condition can make it hard for you to breathe. In adults, acute bronchitis usually goes away within 2 weeks. A cough caused by bronchitis may last up to 3 weeks. Smoking, allergies, and asthma can make the condition worse.  What are the causes?  This condition is caused by:  · Cold and flu viruses. The most common cause of this condition is the virus that causes the common cold.  · Bacteria.  · Substances that irritate the lungs, including:  ? Smoke from cigarettes and other types of tobacco.  ? Dust and pollen.  ? Fumes from chemicals, gases, or burned fuel.  ? Other materials that pollute indoor or outdoor air.  · Close contact with someone who has acute bronchitis.  What increases the risk?  The following factors may make you more likely to develop this condition:  · A weak body's defense system. This is also called the immune system.  · Any condition that affects your lungs and breathing, such as asthma.  What are the signs or symptoms?  Symptoms of this condition include:  · A cough.  · Coughing up clear, yellow, or green mucus.  · Wheezing.  · Chest congestion.  · Shortness of breath.  · A fever.  · Body aches.  · Chills.  · A sore throat.  How is this treated?  Acute bronchitis may go away over time without treatment. Your doctor may recommend:  · Drinking more fluids.  · Taking a medicine for a fever or cough.  · Using a device that gets medicine into your lungs (inhaler).  · Using a vaporizer or a humidifier. These are machines that add water or moisture in the air to help  with coughing and poor breathing.  Follow these instructions at home:    Activity  · Get a lot of rest.  · Avoid places where there are fumes from chemicals.  · Return to your normal activities as told by your doctor. Ask your doctor what activities are safe for you.  Lifestyle  · Drink enough fluids to keep your pee (urine) pale yellow.  · Do not drink alcohol.  · Do not use any products that contain nicotine or tobacco, such as cigarettes, e-cigarettes, and chewing tobacco. If you need help quitting, ask your doctor. Be aware that:  ? Your bronchitis will get worse if you smoke or breathe in other people's smoke (secondhand smoke).  ? Your lungs will heal faster if you quit smoking.  General instructions  · Take over-the-counter and prescription medicines only as told by your doctor.  · Use an inhaler, cool mist vaporizer, or humidifier as told by your doctor.  · Rinse your mouth often with salt water. To make salt water, dissolve ½-1 tsp (3-6 g) of salt in 1 cup (237 mL) of warm water.  · Keep all follow-up visits as told by your doctor. This is important.  How is this prevented?  To lower your risk of getting this condition again:  · Wash your hands often with soap and water. If soap and water are not available, use hand .  · Avoid contact with people who have cold symptoms.  · Try not to touch your mouth, nose, or eyes with your hands.  · Make sure to get the flu shot every year.  Contact a doctor if:  · Your symptoms do not get better in 2 weeks.  · You vomit more than once or twice.  · You have symptoms of loss of fluid from your body (dehydration). These include:  ? Dark urine.  ? Dry skin or eyes.  ? Increased thirst.  ? Headaches.  ? Confusion.  ? Muscle cramps.  Get help right away if:  · You cough up blood.  · You have chest pain.  · You have very bad shortness of breath.  · You become dehydrated.  · You faint or keep feeling like you are going to faint.  · You keep vomiting.  · You have a very  bad headache.  · Your fever or chills get worse.  These symptoms may be an emergency. Do not wait to see if the symptoms will go away. Get medical help right away. Call your local emergency services (911 in the [place] Do not drive yourself to the hospital.  Summary  · Acute bronchitis is when air tubes in the lungs (bronchi) suddenly get swollen. In adults, acute bronchitis usually goes away within 2 weeks.  · Take over-the-counter and prescription medicines only as told by your doctor.  · Drink enough fluid to keep your pee (urine) pale yellow.  · Contact a doctor if your symptoms do not improve after 2 weeks of treatment.  · Get help right away if you cough up blood, faint, or have chest pain or shortness of breath.  This information is not intended to replace advice given to you by your health care provider. Make sure you discuss any questions you have with your health care provider.  Document Revised: 07/10/2020 Document Reviewed: 07/10/2020  Elsevier Patient Education © 2021 [employer]

## 2022-01-24 ENCOUNTER — TRANSCRIBE ORDERS (OUTPATIENT)
Dept: ADMINISTRATIVE | Facility: HOSPITAL | Age: 57
End: 2022-01-24

## 2022-01-24 DIAGNOSIS — Z12.31 VISIT FOR SCREENING MAMMOGRAM: Primary | ICD-10-CM

## 2022-03-24 ENCOUNTER — HOSPITAL ENCOUNTER (OUTPATIENT)
Dept: MAMMOGRAPHY | Facility: HOSPITAL | Age: 57
Discharge: HOME | End: 2022-03-24
Admitting: OBSTETRICS & GYNECOLOGY

## 2022-03-24 DIAGNOSIS — Z12.31 VISIT FOR SCREENING MAMMOGRAM: ICD-10-CM

## 2022-03-24 PROCEDURE — 77063 BREAST TOMOSYNTHESIS BI: CPT

## 2022-03-24 PROCEDURE — 77067 SCR MAMMO BI INCL CAD: CPT | Performed by: RADIOLOGY

## 2022-03-24 PROCEDURE — 77067 SCR MAMMO BI INCL CAD: CPT

## 2022-03-24 PROCEDURE — 77063 BREAST TOMOSYNTHESIS BI: CPT | Performed by: RADIOLOGY

## 2022-05-10 ENCOUNTER — TELEPHONE (OUTPATIENT)
Dept: FAMILY MEDICINE CLINIC | Facility: CLINIC | Age: 57
End: 2022-05-10

## 2022-05-10 ENCOUNTER — LAB (OUTPATIENT)
Dept: LAB | Facility: HOSPITAL | Age: 57
End: 2022-05-10

## 2022-05-10 ENCOUNTER — OFFICE VISIT (OUTPATIENT)
Dept: FAMILY MEDICINE CLINIC | Facility: CLINIC | Age: 57
End: 2022-05-10

## 2022-05-10 VITALS
HEART RATE: 84 BPM | HEIGHT: 62 IN | SYSTOLIC BLOOD PRESSURE: 122 MMHG | WEIGHT: 145.6 LBS | OXYGEN SATURATION: 99 % | BODY MASS INDEX: 26.79 KG/M2 | DIASTOLIC BLOOD PRESSURE: 74 MMHG | TEMPERATURE: 98 F

## 2022-05-10 DIAGNOSIS — M79.675 PAIN AND SWELLING OF TOE OF LEFT FOOT: Primary | ICD-10-CM

## 2022-05-10 DIAGNOSIS — M77.8 LEFT ELBOW TENDONITIS: ICD-10-CM

## 2022-05-10 DIAGNOSIS — M79.89 PAIN AND SWELLING OF TOE OF LEFT FOOT: Primary | ICD-10-CM

## 2022-05-10 LAB
ALBUMIN SERPL-MCNC: 4.3 G/DL (ref 3.5–5.2)
ALBUMIN/GLOB SERPL: 1.5 G/DL
ALP SERPL-CCNC: 98 U/L (ref 39–117)
ALT SERPL W P-5'-P-CCNC: 15 U/L (ref 1–33)
ANION GAP SERPL CALCULATED.3IONS-SCNC: 9.8 MMOL/L (ref 5–15)
AST SERPL-CCNC: 22 U/L (ref 1–32)
BASOPHILS # BLD AUTO: 0.03 10*3/MM3 (ref 0–0.2)
BASOPHILS NFR BLD AUTO: 0.6 % (ref 0–1.5)
BILIRUB SERPL-MCNC: 0.3 MG/DL (ref 0–1.2)
BUN SERPL-MCNC: 12 MG/DL (ref 6–20)
BUN/CREAT SERPL: 18.2 (ref 7–25)
CALCIUM SPEC-SCNC: 9.8 MG/DL (ref 8.6–10.5)
CHLORIDE SERPL-SCNC: 103 MMOL/L (ref 98–107)
CO2 SERPL-SCNC: 26.2 MMOL/L (ref 22–29)
CREAT SERPL-MCNC: 0.66 MG/DL (ref 0.57–1)
CRP SERPL-MCNC: <0.3 MG/DL (ref 0–0.5)
DEPRECATED RDW RBC AUTO: 41.1 FL (ref 37–54)
EGFRCR SERPLBLD CKD-EPI 2021: 103.7 ML/MIN/1.73
EOSINOPHIL # BLD AUTO: 0.03 10*3/MM3 (ref 0–0.4)
EOSINOPHIL NFR BLD AUTO: 0.6 % (ref 0.3–6.2)
ERYTHROCYTE [DISTWIDTH] IN BLOOD BY AUTOMATED COUNT: 14.4 % (ref 12.3–15.4)
ERYTHROCYTE [SEDIMENTATION RATE] IN BLOOD: 15 MM/HR (ref 0–30)
GLOBULIN UR ELPH-MCNC: 2.9 GM/DL
GLUCOSE SERPL-MCNC: 84 MG/DL (ref 65–99)
HCT VFR BLD AUTO: 36.5 % (ref 34–46.6)
HGB BLD-MCNC: 11.8 G/DL (ref 12–15.9)
IMM GRANULOCYTES # BLD AUTO: 0.01 10*3/MM3 (ref 0–0.05)
IMM GRANULOCYTES NFR BLD AUTO: 0.2 % (ref 0–0.5)
LYMPHOCYTES # BLD AUTO: 1.35 10*3/MM3 (ref 0.7–3.1)
LYMPHOCYTES NFR BLD AUTO: 28.6 % (ref 19.6–45.3)
MCH RBC QN AUTO: 25.8 PG (ref 26.6–33)
MCHC RBC AUTO-ENTMCNC: 32.3 G/DL (ref 31.5–35.7)
MCV RBC AUTO: 79.7 FL (ref 79–97)
MONOCYTES # BLD AUTO: 0.36 10*3/MM3 (ref 0.1–0.9)
MONOCYTES NFR BLD AUTO: 7.6 % (ref 5–12)
NEUTROPHILS NFR BLD AUTO: 2.94 10*3/MM3 (ref 1.7–7)
NEUTROPHILS NFR BLD AUTO: 62.4 % (ref 42.7–76)
NRBC BLD AUTO-RTO: 0 /100 WBC (ref 0–0.2)
PLATELET # BLD AUTO: 241 10*3/MM3 (ref 140–450)
PMV BLD AUTO: 10.2 FL (ref 6–12)
POTASSIUM SERPL-SCNC: 4.3 MMOL/L (ref 3.5–5.2)
PROT SERPL-MCNC: 7.2 G/DL (ref 6–8.5)
RBC # BLD AUTO: 4.58 10*6/MM3 (ref 3.77–5.28)
SODIUM SERPL-SCNC: 139 MMOL/L (ref 136–145)
URATE SERPL-MCNC: 3.5 MG/DL (ref 2.4–5.7)
WBC NRBC COR # BLD: 4.72 10*3/MM3 (ref 3.4–10.8)

## 2022-05-10 PROCEDURE — 36415 COLL VENOUS BLD VENIPUNCTURE: CPT | Performed by: NURSE PRACTITIONER

## 2022-05-10 PROCEDURE — 85652 RBC SED RATE AUTOMATED: CPT | Performed by: NURSE PRACTITIONER

## 2022-05-10 PROCEDURE — 85025 COMPLETE CBC W/AUTO DIFF WBC: CPT | Performed by: NURSE PRACTITIONER

## 2022-05-10 PROCEDURE — 86140 C-REACTIVE PROTEIN: CPT | Performed by: NURSE PRACTITIONER

## 2022-05-10 PROCEDURE — 84550 ASSAY OF BLOOD/URIC ACID: CPT | Performed by: NURSE PRACTITIONER

## 2022-05-10 PROCEDURE — 80053 COMPREHEN METABOLIC PANEL: CPT | Performed by: NURSE PRACTITIONER

## 2022-05-10 PROCEDURE — 86431 RHEUMATOID FACTOR QUANT: CPT | Performed by: NURSE PRACTITIONER

## 2022-05-10 PROCEDURE — 99213 OFFICE O/P EST LOW 20 MIN: CPT | Performed by: NURSE PRACTITIONER

## 2022-05-10 NOTE — PROGRESS NOTES
"Chief Complaint  Toe Pain (Swollen for a month, painful)    Subjective          Merry Weller presents to [facility]  Patient is a 56-year-old female.  She is here for complaint of left second toe pain. It is tender and swollen.   Started over a month ago. Denies any known injury. Denies hx of gout. She is complaining left elbow pain. It has been worsened with over using. She has been taking ibuprofen 800 mg prn. Using some ice.             Toe Pain   The injury mechanism is unknown. The pain is present in the left toes. The quality of the pain is described as aching. The pain is at a severity of 4/10. The pain is moderate. The pain has been fluctuating since onset. Pertinent negatives include no inability to bear weight, loss of motion, loss of sensation, muscle weakness, numbness or tingling. She reports no foreign bodies present. The symptoms are aggravated by movement. She has tried ice and NSAIDs for the symptoms. The treatment provided mild relief.       The following portions of the patient's history were reviewed and updated as appropriate: allergies, current medications, past family history, past medical history, past social history, past surgical history and problem list.    Review of Systems   Constitutional: Negative.    HENT: Negative.    Cardiovascular: Negative.    Gastrointestinal: Negative.    Genitourinary: Negative.    Musculoskeletal: Positive for myalgias.        Left elbow pain  Left second toe swollen -    Skin: Negative.    Neurological: Negative for tingling and numbness.   Hematological: Negative.    Psychiatric/Behavioral: Negative.          Objective   Vital Signs:   /74   Pulse 84   Temp 98 °F (36.7 °C)   Ht 157.5 cm (62\")   Wt 66 kg (145 lb 9.6 oz)   SpO2 99%   BMI 26.63 kg/m²                  Physical Exam  Vitals reviewed.   Constitutional:       Appearance: She is well-developed. She is not ill-appearing.   HENT:      Head: " Normocephalic.      Mouth/Throat:      Mouth: Mucous membranes are moist.   Eyes:      Conjunctiva/sclera: Conjunctivae normal.      Pupils: Pupils are equal, round, and reactive to light.   Cardiovascular:      Rate and Rhythm: Normal rate and regular rhythm.      Pulses: Normal pulses.      Heart sounds: Normal heart sounds.   Pulmonary:      Effort: Pulmonary effort is normal.      Breath sounds: Normal breath sounds.   Abdominal:      General: Bowel sounds are normal.      Palpations: Abdomen is soft.   Musculoskeletal:         General: Swelling present.      Left elbow: Swelling present. Tenderness present.      Cervical back: Normal range of motion.        Feet:    Feet:      Comments: Swelling to left second toe  No drainage, does not appear to be dislocated.   No discoloration. Warm, pink, and moveable.     Lymphadenopathy:      Cervical: No cervical adenopathy.   Skin:     General: Skin is warm and dry.      Capillary Refill: Capillary refill takes less than 2 seconds.   Neurological:      Mental Status: She is alert and oriented to person, place, and time.   Psychiatric:         Mood and Affect: Mood normal.         Speech: Speech normal.         Behavior: Behavior normal. Behavior is cooperative.        Result Review :                 Assessment and Plan    Diagnoses and all orders for this visit:    1. Pain and swelling of toe of left foot (Primary)  -     XR toe 2+ vw left; Future  -     CBC & Differential; Future  -     Comprehensive Metabolic Panel; Future  -     Uric acid; Future  -     Rheumatoid Factor; Future  -     Sedimentation Rate; Future  -     C-reactive protein; Future  -     CBC & Differential  -     Comprehensive Metabolic Panel  -     Uric acid  -     Rheumatoid Factor  -     Sedimentation Rate  -     C-reactive protein    2. Left elbow tendonitis    She had been taking ibuprofen. Continue.   RICE  volteran gel prn for both elbow and toe.     volteran gel for left elbow- DORIAN     Follow  up with PCP in 2 weeks and as needed.         Follow Up   Return if symptoms worsen or fail to improve, for Annual.  Patient was given instructions and counseling regarding her condition or for health maintenance advice. Please see specific information pulled into the AVS if appropriate.

## 2022-05-11 LAB — CHROMATIN AB SERPL-ACNC: <10 IU/ML (ref 0–14)

## 2022-06-14 ENCOUNTER — OFFICE VISIT (OUTPATIENT)
Dept: FAMILY MEDICINE CLINIC | Facility: CLINIC | Age: 57
End: 2022-06-14

## 2022-06-14 ENCOUNTER — LAB (OUTPATIENT)
Dept: LAB | Facility: HOSPITAL | Age: 57
End: 2022-06-14

## 2022-06-14 VITALS
SYSTOLIC BLOOD PRESSURE: 110 MMHG | OXYGEN SATURATION: 98 % | BODY MASS INDEX: 27.05 KG/M2 | WEIGHT: 147 LBS | DIASTOLIC BLOOD PRESSURE: 76 MMHG | RESPIRATION RATE: 18 BRPM | TEMPERATURE: 98.2 F | HEART RATE: 73 BPM | HEIGHT: 62 IN

## 2022-06-14 DIAGNOSIS — Z00.00 ENCOUNTER FOR ANNUAL PHYSICAL EXAMINATION EXCLUDING GYNECOLOGICAL EXAMINATION IN A PATIENT OLDER THAN 17 YEARS: ICD-10-CM

## 2022-06-14 DIAGNOSIS — Z23 IMMUNIZATION DUE: ICD-10-CM

## 2022-06-14 DIAGNOSIS — Z13.820 ENCOUNTER FOR OSTEOPOROSIS SCREENING IN ASYMPTOMATIC POSTMENOPAUSAL PATIENT: Primary | ICD-10-CM

## 2022-06-14 DIAGNOSIS — G89.29 CHRONIC FOOT PAIN, LEFT: ICD-10-CM

## 2022-06-14 DIAGNOSIS — M54.50 ACUTE BILATERAL LOW BACK PAIN WITHOUT SCIATICA: ICD-10-CM

## 2022-06-14 DIAGNOSIS — M79.672 CHRONIC FOOT PAIN, LEFT: ICD-10-CM

## 2022-06-14 DIAGNOSIS — M77.8 LEFT ELBOW TENDONITIS: ICD-10-CM

## 2022-06-14 DIAGNOSIS — Z13.1 SCREENING FOR DIABETES MELLITUS: ICD-10-CM

## 2022-06-14 DIAGNOSIS — Z13.220 SCREENING, LIPID: ICD-10-CM

## 2022-06-14 DIAGNOSIS — Z78.0 ENCOUNTER FOR OSTEOPOROSIS SCREENING IN ASYMPTOMATIC POSTMENOPAUSAL PATIENT: Primary | ICD-10-CM

## 2022-06-14 DIAGNOSIS — M25.512 CHRONIC LEFT SHOULDER PAIN: ICD-10-CM

## 2022-06-14 DIAGNOSIS — G89.29 CHRONIC LEFT SHOULDER PAIN: ICD-10-CM

## 2022-06-14 DIAGNOSIS — Z13.29 SCREENING FOR THYROID DISORDER: ICD-10-CM

## 2022-06-14 PROBLEM — U07.1 COVID-19: Status: ACTIVE | Noted: 2022-06-14

## 2022-06-14 LAB
BILIRUB UR QL STRIP: NEGATIVE
CHOLEST SERPL-MCNC: 185 MG/DL (ref 0–200)
CLARITY UR: CLEAR
COLOR UR: YELLOW
GLUCOSE UR STRIP-MCNC: NEGATIVE MG/DL
HBA1C MFR BLD: 6 % (ref 4.8–5.6)
HDLC SERPL-MCNC: 51 MG/DL (ref 40–60)
HGB UR QL STRIP.AUTO: NEGATIVE
KETONES UR QL STRIP: ABNORMAL
LDLC SERPL CALC-MCNC: 113 MG/DL (ref 0–100)
LDLC/HDLC SERPL: 2.17 {RATIO}
LEUKOCYTE ESTERASE UR QL STRIP.AUTO: NEGATIVE
NITRITE UR QL STRIP: NEGATIVE
PH UR STRIP.AUTO: 6 [PH] (ref 5–8)
PROT UR QL STRIP: ABNORMAL
SP GR UR STRIP: 1.03 (ref 1–1.03)
TRIGL SERPL-MCNC: 117 MG/DL (ref 0–150)
TSH SERPL DL<=0.05 MIU/L-ACNC: 2.38 UIU/ML (ref 0.27–4.2)
UROBILINOGEN UR QL STRIP: ABNORMAL
VLDLC SERPL-MCNC: 21 MG/DL (ref 5–40)

## 2022-06-14 PROCEDURE — 81003 URINALYSIS AUTO W/O SCOPE: CPT | Performed by: NURSE PRACTITIONER

## 2022-06-14 PROCEDURE — 80061 LIPID PANEL: CPT | Performed by: NURSE PRACTITIONER

## 2022-06-14 PROCEDURE — 99396 PREV VISIT EST AGE 40-64: CPT | Performed by: NURSE PRACTITIONER

## 2022-06-14 PROCEDURE — 90750 HZV VACC RECOMBINANT IM: CPT | Performed by: NURSE PRACTITIONER

## 2022-06-14 PROCEDURE — 82306 VITAMIN D 25 HYDROXY: CPT | Performed by: NURSE PRACTITIONER

## 2022-06-14 PROCEDURE — 84443 ASSAY THYROID STIM HORMONE: CPT | Performed by: NURSE PRACTITIONER

## 2022-06-14 PROCEDURE — 83036 HEMOGLOBIN GLYCOSYLATED A1C: CPT | Performed by: NURSE PRACTITIONER

## 2022-06-14 RX ORDER — POLYETHYLENE GLYCOL 3350 17 G/17G
POWDER, FOR SOLUTION ORAL
COMMUNITY
Start: 2022-02-01

## 2022-06-14 NOTE — PROGRESS NOTES
"Chief Complaint  Annual Exam    Subjective          Merry Weller presents to [facility]  Patient is a 56-year-old female.  She was a patient of Dr. Juaquin Wilson.  He has left the practice and retired.  She is here for annual exam with fasting labs.  She has complaints about continued left elbow pain/tendinitis.  She is taken naproxen occasionally for the issue.  Mild improvement.  Pain to her left foot with walking, started some years ago. Waxes and wanes. Discussed referring to see a podiatrist. She is agreeable.   She has a history of GERD, low back pain, chronic left shoulder pain.  She states she had COVID-19 in May.  She did not require hospitalization and she has recovered.  dUp to date : eye exam and dental exam   Last pap done within the last year.       The following portions of the patient's history were reviewed and updated as appropriate: allergies, current medications, past family history, past medical history, past social history, past surgical history and problem list.    Review of Systems   Constitutional: Negative.    HENT: Negative.    Eyes: Negative.    Respiratory: Negative.    Cardiovascular: Negative.    Gastrointestinal: Negative.    Genitourinary: Negative.    Musculoskeletal: Positive for arthralgias, myalgias and neck pain.        Left elbow tendonitis   Left foot pain  Neck pain - when working on computer   Skin: Negative.    Allergic/Immunologic: Negative.    Neurological: Negative.    Hematological: Negative.    Psychiatric/Behavioral: Negative.           Objective   Vital Signs:   /76   Pulse 73   Temp 98.2 °F (36.8 °C)   Resp 18   Ht 157.5 cm (62\")   Wt 66.7 kg (147 lb)   SpO2 98%   BMI 26.89 kg/m²      PHQ-2/9 Depression Screening  PHQ-9 Total Score: 0    KALEN-7 Anxiety Screening  KALEN-7  Feeling nervous, anxious or on edge: Not at all  Not being able to stop or control worrying: Not at all  Worrying too much about different things: " Not at all  Trouble Relaxing: Not at all  Being so restless that it is hard to sit still: Not at all  Feeling afraid as if something awful might happen: Not at all  Becoming easily annoyed or irritable: Not at all  KALEN 7 Total Score: 0  If you checked any problems, how difficult have these problems made it for you to do your work, take care of things at home, or get along with other people: Not difficult at all          Physical Exam  Vitals reviewed.   Constitutional:       Appearance: She is well-developed. She is not ill-appearing.   HENT:      Head: Normocephalic.      Right Ear: Tympanic membrane, ear canal and external ear normal.      Left Ear: Tympanic membrane, ear canal and external ear normal.      Mouth/Throat:      Mouth: Mucous membranes are moist.   Eyes:      Conjunctiva/sclera: Conjunctivae normal.      Pupils: Pupils are equal, round, and reactive to light.   Cardiovascular:      Rate and Rhythm: Normal rate and regular rhythm.      Heart sounds: Normal heart sounds.   Pulmonary:      Effort: Pulmonary effort is normal.      Breath sounds: Normal breath sounds.   Abdominal:      General: Bowel sounds are normal.      Palpations: Abdomen is soft.   Musculoskeletal:         General: Normal range of motion.      Cervical back: Normal range of motion.   Lymphadenopathy:      Cervical: No cervical adenopathy.   Skin:     General: Skin is warm and dry.      Capillary Refill: Capillary refill takes less than 2 seconds.   Neurological:      Mental Status: She is alert and oriented to person, place, and time.   Psychiatric:         Mood and Affect: Mood normal.         Speech: Speech normal.         Behavior: Behavior normal. Behavior is cooperative.         Thought Content: Thought content normal.         Judgment: Judgment normal.        Result Review :                 Assessment and Plan    Diagnoses and all orders for this visit:    1. Encounter for osteoporosis screening in asymptomatic postmenopausal  patient (Primary)  -     DEXA Bone Density Axial; Future    2. Acute bilateral low back pain without sciatica    3. Chronic left shoulder pain    4. Left elbow tendonitis  -     Ambulatory Referral to Physical Therapy Evaluate and treat    5. Immunization due  -     Shingrix Vaccine    6. Encounter for annual physical examination excluding gynecological examination in a patient older than 17 years  -     Vitamin D 25 Hydroxy; Future  -     TSH; Future  -     Urinalysis With Culture If Indicated - Urine, Clean Catch; Future  -     Lipid Panel; Future  -     Hemoglobin A1c; Future  -     Vitamin D 25 Hydroxy  -     TSH  -     Urinalysis With Culture If Indicated - Urine, Clean Catch  -     Lipid Panel  -     Hemoglobin A1c    7. Screening for thyroid disorder  -     TSH; Future  -     TSH    8. Screening for diabetes mellitus  -     Hemoglobin A1c; Future  -     Hemoglobin A1c    9. Screening, lipid  -     TSH; Future  -     TSH    10. Chronic foot pain, left  -     Ambulatory Referral to Podiatry    follow up in 2-6 months for #2 shingrix   Referral to podiatry for chronic left foot pain   Continue naprosyn prn.     Discussed neck stretches. May need to go to PT.   Patient Counseling:  --Nutrition: Stressed importance of moderation in sodium/caffeine intake, saturated fat and cholesterol, caloric balance, sufficient intake of fresh fruits, vegetables, fiber, calcium, iron--Exercise: Stressed the importance of regular exercise.   --Substance Abuse: Discussed cessation/primary prevention of tobacco, alcohol, or other drug use; driving or other dangerous activities under the influence; availability of treatment for abuse.    --Sexuality: Discussed sexually transmitted diseases, partner selection, use of condoms, avoidance of unintended pregnancy and contraceptive alternatives.   --Injury prevention: Discussed safety belts, safety helmets, smoke detector, smoking near bedding or upholstery.   --Dental health: Discussed  importance of regular tooth brushing, flossing, and dental visits.  --Immunizations reviewed. Ordered shingrix.   --Discussed benefits of screening colonoscopy. Up to date.     --After hours’ service discussed with patient     Discussed the nature of the medical condition(s) risks, complications, implications, and management, safe and proper use of medications. Encouraged medication compliance, and keeping scheduled follow up appointments with me and any other providers.        I spent 35 minutes caring for Merry on this date of service. This time includes time spent by me in the following activities:preparing for the visit, reviewing tests, obtaining and/or reviewing a separately obtained history, performing a medically appropriate examination and/or evaluation , counseling and educating the patient/family/caregiver, ordering medications, tests, or procedures, referring and communicating with other health care professionals  and documenting information in the medical record  Follow Up   Return in about 2 months (around 8/14/2022), or review labs and #2 shingles vaccine, for Recheck.  Patient was given instructions and counseling regarding her condition or for health maintenance advice. Please see specific information pulled into the AVS if appropriate.

## 2022-06-15 LAB — 25(OH)D3 SERPL-MCNC: 38.2 NG/ML (ref 30–100)

## 2022-06-22 ENCOUNTER — HOSPITAL ENCOUNTER (OUTPATIENT)
Dept: BONE DENSITY | Facility: HOSPITAL | Age: 57
Discharge: HOME | End: 2022-06-22
Admitting: NURSE PRACTITIONER

## 2022-06-22 DIAGNOSIS — Z13.820 ENCOUNTER FOR OSTEOPOROSIS SCREENING IN ASYMPTOMATIC POSTMENOPAUSAL PATIENT: ICD-10-CM

## 2022-06-22 DIAGNOSIS — Z78.0 ENCOUNTER FOR OSTEOPOROSIS SCREENING IN ASYMPTOMATIC POSTMENOPAUSAL PATIENT: ICD-10-CM

## 2022-06-22 PROCEDURE — 77080 DXA BONE DENSITY AXIAL: CPT

## 2022-06-23 ENCOUNTER — TREATMENT (OUTPATIENT)
Dept: PHYSICAL THERAPY | Facility: CLINIC | Age: 57
End: 2022-06-23

## 2022-06-23 DIAGNOSIS — M25.522 LEFT ELBOW PAIN: Primary | ICD-10-CM

## 2022-06-23 PROCEDURE — 97161 PT EVAL LOW COMPLEX 20 MIN: CPT | Performed by: PHYSICAL THERAPIST

## 2022-06-23 PROCEDURE — 97110 THERAPEUTIC EXERCISES: CPT | Performed by: PHYSICAL THERAPIST

## 2022-06-23 NOTE — PROGRESS NOTES
Physical Therapy Initial Evaluation and Plan of Care      Patient: Merry Weller   : 1965  Diagnosis/ICD-10 Code:  The encounter diagnosis was Left elbow pain.   Referring practitioner: LUIS ANTONIO Person  Date of Initial Visit: Type: THERAPY  Noted: 2022  Today's Date: 2022  Patient seen for 1 sessions         Visit Diagnoses:    ICD-10-CM ICD-9-CM   1. Left elbow pain  M25.522 719.42       Subjective Questionnaire: QuickDASH: 31.82    Subjective Evaluation    History of Present Illness  Date of onset: 2022  Mechanism of injury: Started with archery .  She is a .  She was gripping a bow tightly to demonstrate and has had pain ever since.    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  Relieving factors: rest and medications (keeping elbow bent)  Aggravating factors: lifting (extending elbow, gripping)  Progression: improved    Hand dominance: right    Treatments  Previous treatment: medication  Patient Goals  Patient goals for therapy: decreased pain and return to sport/leisure activities           Treatment                Objective          Tenderness     Left Elbow   Tenderness in the lateral epicondyle.     Left Wrist/Hand   Tenderness in the lateral epicondyle.     Neurological Testing     Sensation     Elbow   Left Elbow   Intact: light touch    Right Elbow   Intact: light touch    Active Range of Motion     Left Elbow   Normal active range of motion    Right Elbow   Normal active range of motion    Strength/Myotome Testing     Left Elbow   Normal strength    Right Elbow   Normal strength    Additional Strength Details  Pain with resisted L supination and wrist/finger extension.          Assessment & Plan     Assessment  Impairments: activity intolerance, lacks appropriate home  exercise program and pain with function  Functional Limitations: lifting, uncomfortable because of pain and unable to perform repetitive tasks  Assessment details: Clinical presentation is consistent with lateral epicondylitis.  Pt will benefit from PT intervention to address pain and noted functional deficits related to pain.  Prognosis: good    Goals  Plan Goals: 4 weeks  1)Pt. demonstrates independence and compliance with initial HEP.  2)Pt. reports reduction in pain intensity to no worse than 4/10 on NPRS.  3)L  strength is improved by 5# without increase in pain.    6 weeks  1)Pt. demonstrates independence in advanced HEP for ongoing improvement and self-management to prevent/reduce recurrence.  2)L  strength is within age-related norms to allow participation in desired activities.  3)Functional outcome measure is improved to 25 points or better.          Plan  Therapy options: will be seen for skilled therapy services  Planned modality interventions: cryotherapy and iontophoresis  Planned therapy interventions: manual therapy, neuromuscular re-education, soft tissue mobilization, strengthening, stretching, therapeutic activities, joint mobilization, home exercise program, functional ROM exercises and flexibility  Frequency: 2x week  Duration in weeks: 6  Treatment plan discussed with: patient  Plan details: PT up to 2x/week for 6 weeks per POC, addressing pain and related functional deficits.        Timed:  Manual Therapy:         mins  88604;  Therapeutic Exercise:    10     mins  07014;     Neuromuscular Mina:        mins  57329;    Therapeutic Activity:          mins  29789;     Gait Training:           mins  06464;     Ultrasound:          mins  68132;    Electrical Stimulation:         mins  93469 ( );  Iontophoresis  __5_ mins   71194    Untimed:  Electrical Stimulation:         mins  30777 ( );  Mechanical Traction:         mins  81535;     Timed Treatment:   15   mins   Total  Treatment:     40   mins    PT SIGNATURE: Yasmeen Song PT   Electronically signed  DATE TREATMENT INITIATED: 6/23/2022    Initial Certification  Certification Period: 6/23/2022thru9/20/2022  I certify that the therapy services are furnished while this patient is under my care.  The services outlined above are required by this patient, and will be reviewed every 90 days.    Physician Signature:_____________________________________________             PHYSICIAN: Geri Beltran APRN  NPI: [id number]                                      DATE:       Please sign and return via fax to [phone].. Thank you, [facility].

## 2022-06-28 ENCOUNTER — TREATMENT (OUTPATIENT)
Dept: PHYSICAL THERAPY | Facility: CLINIC | Age: 57
End: 2022-06-28

## 2022-06-28 DIAGNOSIS — M25.522 LEFT ELBOW PAIN: Primary | ICD-10-CM

## 2022-06-28 PROCEDURE — 97140 MANUAL THERAPY 1/> REGIONS: CPT | Performed by: PHYSICAL THERAPIST

## 2022-06-28 PROCEDURE — 97110 THERAPEUTIC EXERCISES: CPT | Performed by: PHYSICAL THERAPIST

## 2022-06-28 NOTE — PROGRESS NOTES
Physical Therapy Daily Progress Note  VISIT: 2          Subjective    Merry Weller reports: mild discomfort at rest, moderate discomfort with activity.      Pre-treatment pain:  1  Post-treatment pain:  1      Objective    Treatment    Exercise 1  Exercise Name 1: UBE  Equipment/Resistance 1: level 1  Time: 5'  Exercise 2  Exercise Name 2: L elbow AROM flex/ext  Exercise 3  Exercise Name 3: HEP review and progression  Exercise 4  Exercise Name 4: wrist extension with band  Equipment/Resistance 4: yellow band  Exercise 5  Exercise Name 5: wrist flexion with band  Equipment/Resistance 5: yellow band  Exercise 6  Exercise Name 6: tricep extension with band  Equipment/Resistance 6: yellow band    Manual Rx 1  Manual Rx 1 Location: L elbow  Manual Rx 1 Type: lateral ulnar glide with gripping, Astym to L elbow and forearm    Iontophoresis 81199  Dexamethasone used: Yes (L lateral epicondyle)  Other Treatment Provided  Dexamethasone used: Yes (L lateral epicondyle)       Assessment & Plan     Assessment    Assessment details: Pt required additional practice for self-mobilization (MWM).  She will add lightly resisted wrist/elbow exercises as tolerated at home.  Soft tissue texture disruption is noted with Astym at lateral epicondyle and posterior aspect of elbow.    Plan  Plan details: Continue and progress as tolerated.               Timed:  Manual Therapy:    15     mins  82242;  Therapeutic Exercise:    23     mins  54180;     Neuromuscular Mina:        mins  81005;    Therapeutic Activity:          mins  40051;     Gait Training:           mins  20244;     Ultrasound:          mins  31060;    Electrical Stimulation:         mins  22867 ( );  Iontophoresis  5  Untimed:  Electrical Stimulation:         mins  13491 ( );  Mechanical Traction:         mins  83263;  Canalith Repositioning       mins  39669    Timed Treatment:   43   mins   Total Treatment:     43   mins      Yasmeen Song  PT  Physical Therapist

## 2022-06-30 ENCOUNTER — TREATMENT (OUTPATIENT)
Dept: PHYSICAL THERAPY | Facility: CLINIC | Age: 57
End: 2022-06-30

## 2022-06-30 DIAGNOSIS — M25.522 LEFT ELBOW PAIN: Primary | ICD-10-CM

## 2022-06-30 PROCEDURE — 97110 THERAPEUTIC EXERCISES: CPT | Performed by: PHYSICAL THERAPIST

## 2022-06-30 PROCEDURE — 97140 MANUAL THERAPY 1/> REGIONS: CPT | Performed by: PHYSICAL THERAPIST

## 2022-06-30 NOTE — PROGRESS NOTES
Physical Therapy Treatment Note  VISIT: 3          Subjective    Merry Weller reports: her arm feels about the same.  It was stiff when she woke up this morning.      Pre-treatment pain:  1  Post-treatment pain:  0      Objective    Treatment    Exercise 1  Exercise Name 1: HEP review    Manual Rx 1  Manual Rx 1 Location: L elbow  Manual Rx 1 Type: belt assisted laterl ulnar glide with gripping-elbow near full extension  Manual Rx 1 Duration: 3x10  Manual Rx 2  Manual Rx 2 Location: L elbow  Manual Rx 2 Type: Astym to left lateral elbow and adjacent soft tissue    Ice  Ice Applied: Yes  Location: L lateral elbow  PT Ice Rx Minutes:  (ice massage to point of numbness)  Other Treatment Provided  Ice Applied: Yes       Assessment & Plan     Assessment    Assessment details: Pt noted reduction in pain when gripping with ulnar glide (belt assisted).    Plan  Plan details: Continue PT.  Pt will gradually progress to theraband exercises at home and continue stretching and self mobilization.               Timed:  Manual Therapy:    20     mins  37019;  Therapeutic Exercise:    10     mins  74501;     Neuromuscular Mina:        mins  02513;    Therapeutic Activity:          mins  75848;     Gait Training:           mins  42188;     Ultrasound:          mins  44850;    Electrical Stimulation:         mins  93438 ( );    Untimed:  Electrical Stimulation:         mins  68654 ( );  Mechanical Traction:         mins  87880;  Canalith Repositioning       mins  63276  Ice massage  5      Timed Treatment:   30   mins   Total Treatment:     35   mins      Yasmeen Song, PT  Physical Therapist

## 2022-07-02 PROBLEM — R73.03 PREDIABETES: Status: ACTIVE | Noted: 2022-07-02

## 2022-07-02 PROBLEM — M85.89 OSTEOPENIA OF MULTIPLE SITES: Status: ACTIVE | Noted: 2022-07-02

## 2022-07-07 ENCOUNTER — TREATMENT (OUTPATIENT)
Dept: PHYSICAL THERAPY | Facility: CLINIC | Age: 57
End: 2022-07-07

## 2022-07-07 DIAGNOSIS — M25.522 LEFT ELBOW PAIN: Primary | ICD-10-CM

## 2022-07-07 PROCEDURE — 97140 MANUAL THERAPY 1/> REGIONS: CPT | Performed by: PHYSICAL THERAPIST

## 2022-07-07 PROCEDURE — 97110 THERAPEUTIC EXERCISES: CPT | Performed by: PHYSICAL THERAPIST

## 2022-07-07 NOTE — PROGRESS NOTES
Physical Therapy Treatment Note  VISIT: 4      Subjective    Merry Weller reports: She thinks she is getting better.  It is easier to extend her elbow.  She was sore following Astym, but better now.    Pre-treatment pain:  1  Post-treatment pain:  1      Objective    Treatment    Exercise 1  Exercise Name 1: HEP review    Manual Rx 1  Manual Rx 1 Location: L elbow  Manual Rx 1 Type: lateral ulnar glide with gripping-elbow near full extension, elbow flexion/supination, elbow extension pronation  Manual Rx 2  Manual Rx 2 Location: L elbow  Manual Rx 2 Type: Astym to left lateral elbow and adjacent soft tissue in forearm and upper arm            Assessment & Plan     Assessment    Assessment details: Pain gradually decreasing, freedom of movement improving.    Plan  Plan details: Continue PT.                 Timed:  Manual Therapy:    20     mins  86999;  Therapeutic Exercise:    8     mins  18274;     Neuromuscular Mina:        mins  38703;    Therapeutic Activity:          mins  59725;     Gait Training:           mins  04262;     Ultrasound:          mins  88910;    Electrical Stimulation:         mins  03486 ( );  Ionto   5  Untimed:  Electrical Stimulation:         mins  49417 ( );  Mechanical Traction:         mins  46366;  Canalith Repositioning       mins  37772    Timed Treatment:   33   mins   Total Treatment:     33   mins      Yasmeen Song, PT  Physical Therapist

## 2022-07-14 ENCOUNTER — TREATMENT (OUTPATIENT)
Dept: PHYSICAL THERAPY | Facility: CLINIC | Age: 57
End: 2022-07-14

## 2022-07-14 DIAGNOSIS — M25.522 LEFT ELBOW PAIN: Primary | ICD-10-CM

## 2022-07-14 PROCEDURE — 97140 MANUAL THERAPY 1/> REGIONS: CPT | Performed by: PHYSICAL THERAPIST

## 2022-07-14 PROCEDURE — 97110 THERAPEUTIC EXERCISES: CPT | Performed by: PHYSICAL THERAPIST

## 2022-07-14 NOTE — PROGRESS NOTES
Physical Therapy Treatment Note  VISIT: 5          Subjective    Merry Weller reports: she notices improvement with decreasing elbow pain.      Pre-treatment pain:  2  Post-treatment pain:  2 (with offending activity)      Objective    Treatment    Exercise 1  Exercise Name 1: HEP review and practice  Exercise 2  Exercise Name 2: UBE warm up  Equipment/Resistance 2: level 5  Exercise 3  Exercise Name 3: wrist extension  Equipment/Resistance 3: red band  Exercise 4  Exercise Name 4: wrist flexion  Equipment/Resistance 4: red band  Exercise 5  Exercise Name 5: wrist extensor stretch  Exercise 6  Exercise Name 6: elbow flex/ext  Equipment/Resistance 6: red band    Manual Rx 1  Manual Rx 1 Location: L elbow  Manual Rx 1 Type: lateral ulnar glide in elbow extension, elbow flexion with towel roll  Manual Rx 2  Manual Rx 2 Location: L elbow  Manual Rx 2 Type: Astym and STM to left lateral elbow and adjacent soft tissue in forearm and upper arm    Ice  Ice Applied: Yes (ice massage to numbness, L lat epicondyle)  Other Treatment Provided  Ice Applied: Yes (ice massage to numbness, L lat epicondyle)       Assessment & Plan     Assessment    Assessment details: Pt is improving.  Cues required for best exercise performance technique.    Plan  Plan details: Continue PT. Progress as tolerated.               Timed:  Manual Therapy:    15     mins  71268;  Therapeutic Exercise:    25     mins  66154;     Neuromuscular Mina:        mins  43536;    Therapeutic Activity:          mins  56396;     Gait Training:           mins  51567;     Ultrasound:          mins  14330;    Electrical Stimulation:         mins  14046 ( );    Untimed:  Electrical Stimulation:         mins  57098 ( );  Mechanical Traction:         mins  09919;  Canalith Repositioning       mins  66044    Timed Treatment:   40   mins   Total Treatment:     45   mins      Yasmeen Song PT  Physical Therapist

## 2022-07-21 ENCOUNTER — TREATMENT (OUTPATIENT)
Dept: PHYSICAL THERAPY | Facility: CLINIC | Age: 57
End: 2022-07-21

## 2022-07-21 DIAGNOSIS — M25.522 LEFT ELBOW PAIN: Primary | ICD-10-CM

## 2022-07-21 PROCEDURE — 97110 THERAPEUTIC EXERCISES: CPT | Performed by: PHYSICAL THERAPIST

## 2022-07-21 PROCEDURE — 97140 MANUAL THERAPY 1/> REGIONS: CPT | Performed by: PHYSICAL THERAPIST

## 2022-07-21 NOTE — PROGRESS NOTES
Physical Therapy Progress Note    Patient: Merry Weller   : 1965  Diagnosis/ICD-10 Code:  The encounter diagnosis was Left elbow pain.   Referring practitioner: LUIS ANTONIO Person  Date of Initial Visit: Type: THERAPY  Noted: 2022  Today's Date: 2022  Visit:  6     Merry Weller reports: no pain at complete rest.  Mild, localized pain with certain activities, stiffness after prolonged position/inactivity.    Subjective     Quick DASH score 18.18 (initially 31.82)    Treatment  Pre-treatment pain:  1  Post-treatment pain:  1    0/10 at best  3/10 at worst      Exercise 1  Exercise Name 1: Reassessment  Exercise 2  Exercise Name 2: HEP review and progresion  Exercise 3  Exercise Name 3: elbow flexion-3-way hand position  Exercise 4  Exercise Name 4: wrist extension  Equipment/Resistance 4: red band  Exercise 5  Exercise Name 5: supination  Equipment/Resistance 5: red band    Manual Rx 1  Manual Rx 1 Location: L elbow  Manual Rx 1 Type: Astym to L lateral epicondyle and adjacent soft tissue        Objective          Active Range of Motion     Left Elbow   Normal active range of motion    Strength/Myotome Testing     Left Elbow   Flexion: 5  Extension: 5  Forearm supination: 4  Forearm pronation: 5    Left Wrist/Hand      (2nd hand position)     Trial 1: 32 lbs    Trial 2: 45 lbs    Trial 3: 52 lbs    Average: 43 lbs    Right Wrist/Hand      (2nd hand position)     Trial 1: 50 lbs    Trial 2: 35 lbs    Trial 3: 43 lbs    Average: 42.67 lbs      Pain with left gripping.    Assessment & Plan     Assessment    Assessment details: Pt demonstrates improvement with decreasing pain and increasing  strength, although still irritating to symptoms.  She will benefit from ongoing PT intervention to address remaining deficits.  She continues to have soft tissue texture disruption at lateral epicondyle noted with Astym.    Goals  Plan Goals: Plan Goals: 4 weeks  1)Pt. demonstrates  independence and compliance with initial HEP-partially met, progressing, (cues for isolated movement, slow, controlled movement).  2)Pt. reports reduction in pain intensity to no worse than 4/10 on NPRS-MET.  3)L  strength is improved by 5# without increase in pain-partially met, increased strength, still painful.    6 weeks  1)Pt. demonstrates independence in advanced HEP for ongoing improvement and self-management to prevent/reduce recurrence-ongoing.  2)L  strength is within age-related norms to allow participation in desired activities-ongoing.  3)Functional outcome measure is improved to 25 points or better-MET.        Plan  Therapy options: will be seen for skilled therapy services  Plan details: Continue PT.  Progress strength as tolerated.               Timed:  Manual Therapy:    10     mins  28337;  Therapeutic Exercise:    30     mins  62485;     Neuromuscular Mina:        mins  22504;    Therapeutic Activity:          mins  46616;     Gait Training:           mins  07187;     Ultrasound:          mins  09917;    Electrical Stimulation:         mins  68197 ( );    Untimed:  Electrical Stimulation:         mins  47339 ( );  Mechanical Traction:         mins  92578;     Timed Treatment:   40   mins   Total Treatment:     40   mins      Yasmeen Song, LONNY  Physical Therapist

## 2022-07-28 ENCOUNTER — TREATMENT (OUTPATIENT)
Dept: PHYSICAL THERAPY | Facility: CLINIC | Age: 57
End: 2022-07-28

## 2022-07-28 DIAGNOSIS — M25.522 LEFT ELBOW PAIN: Primary | ICD-10-CM

## 2022-07-28 PROCEDURE — 97110 THERAPEUTIC EXERCISES: CPT | Performed by: PHYSICAL THERAPIST

## 2022-07-28 PROCEDURE — 97140 MANUAL THERAPY 1/> REGIONS: CPT | Performed by: PHYSICAL THERAPIST

## 2022-07-28 NOTE — PROGRESS NOTES
Physical Therapy Treatment Note  VISIT: 7          Subjective    Merry Weller reports: arm is still improving.  Still stiff when she wakes up in the mornings, but less sensitive, not as easy to aggravate/provoke symptoms.      Pre-treatment pain:  0  Post-treatment pain:  0      Objective    Treatment    Exercise 1  Exercise Name 1: Brief HEP review    Manual Rx 1  Manual Rx 1 Location: L elbow  Manual Rx 1 Type: Astym to L lateral epicondyle and adjacent soft tissue, lateral ulnar glide, radial head mobilization into elbow extension, humeroulnar distraction with elbow flexion over towel roll            Assessment & Plan     Assessment    Assessment details: Symptoms continue to improve.  Pt is responding well to Astym and exercise.    Plan  Plan details: Continue PT for up to 3 more sessions.  Progress or modify as indicated based on symptom response.               Timed:  Manual Therapy:    15     mins  92290;  Therapeutic Exercise:    10     mins  80236;     Neuromuscular Mina:        mins  07143;    Therapeutic Activity:          mins  97132;     Gait Training:           mins  89838;     Ultrasound:          mins  06868;    Electrical Stimulation:         mins  26853 ( );    Untimed:  Electrical Stimulation:         mins  95039 ( );  Mechanical Traction:         mins  66782;  Canalith Repositioning       mins  32803    Timed Treatment:   25   mins   Total Treatment:     25   mins      Yasmeen Song PT  Physical Therapist

## 2022-08-04 ENCOUNTER — TREATMENT (OUTPATIENT)
Dept: PHYSICAL THERAPY | Facility: CLINIC | Age: 57
End: 2022-08-04

## 2022-08-04 DIAGNOSIS — M25.522 LEFT ELBOW PAIN: Primary | ICD-10-CM

## 2022-08-04 PROCEDURE — 97140 MANUAL THERAPY 1/> REGIONS: CPT | Performed by: PHYSICAL THERAPIST

## 2022-08-04 PROCEDURE — 97530 THERAPEUTIC ACTIVITIES: CPT | Performed by: PHYSICAL THERAPIST

## 2022-08-04 NOTE — PROGRESS NOTES
"             Physical Therapy Treatment Note  VISIT: 8          Subjective    Merry Weller reports: her arm is feeling better.  She is able to lift with less pain.  She rates her pain \"0.5\".  She sometimes does not notice it at all.      Pre-treatment pain:  1  Post-treatment pain:  1      Objective    Treatment    Exercise 1  Exercise Name 1: Review of symptom behavior and response to treatment as well as current functional abilities.    Manual Rx 1  Manual Rx 1 Location: L elbow  Manual Rx 1 Type: Astym to L lateral epicondyle and adjacent soft tissue, lateral ulnar glide, radial head mobilization into elbow extension, humeroulnar distraction with elbow flexion over towel roll            Assessment & Plan     Assessment    Assessment details: Symptoms continue to improve with decreasing pain and increasing activity tolerance.  Pt is independent in existing HEP.      Plan  Plan details: Continue for 1-2 remaining sessions as needed based on symptom response.               Timed:  Manual Therapy:    15     mins  05702;  Therapeutic Exercise:         mins  36743;     Neuromuscular Mina:        mins  11798;    Therapeutic Activity:     10     mins  87557;     Gait Training:           mins  41733;     Ultrasound:          mins  12077;    Electrical Stimulation:         mins  83136 ( );    Untimed:  Electrical Stimulation:         mins  16932 ( );  Mechanical Traction:         mins  91145;  Canalith Repositioning       mins  22605    Timed Treatment:   25   mins   Total Treatment:     25   mins      Yasmeen Song PT  Physical Therapist                    "

## 2022-08-08 ENCOUNTER — OFFICE VISIT (OUTPATIENT)
Dept: FAMILY MEDICINE CLINIC | Facility: CLINIC | Age: 57
End: 2022-08-08

## 2022-08-08 VITALS
RESPIRATION RATE: 22 BRPM | TEMPERATURE: 98 F | WEIGHT: 147 LBS | HEART RATE: 72 BPM | BODY MASS INDEX: 27.05 KG/M2 | OXYGEN SATURATION: 98 % | HEIGHT: 62 IN | DIASTOLIC BLOOD PRESSURE: 80 MMHG | SYSTOLIC BLOOD PRESSURE: 114 MMHG

## 2022-08-08 DIAGNOSIS — M79.89 PAIN AND SWELLING OF TOE OF LEFT FOOT: ICD-10-CM

## 2022-08-08 DIAGNOSIS — H65.191 ACUTE OTITIS MEDIA WITH EFFUSION OF RIGHT EAR: Primary | ICD-10-CM

## 2022-08-08 DIAGNOSIS — M79.675 PAIN AND SWELLING OF TOE OF LEFT FOOT: ICD-10-CM

## 2022-08-08 PROCEDURE — 99213 OFFICE O/P EST LOW 20 MIN: CPT | Performed by: NURSE PRACTITIONER

## 2022-08-08 RX ORDER — PREDNISONE 10 MG/1
TABLET ORAL
Qty: 21 TABLET | Refills: 0 | Status: SHIPPED | OUTPATIENT
Start: 2022-08-08 | End: 2022-08-23

## 2022-08-08 RX ORDER — DOXYCYCLINE 100 MG/1
100 TABLET ORAL 2 TIMES DAILY
Qty: 20 TABLET | Refills: 0 | Status: SHIPPED | OUTPATIENT
Start: 2022-08-08 | End: 2022-08-18

## 2022-08-08 NOTE — ASSESSMENT & PLAN NOTE
Reviewed previous labs and x-ray of toe with no significant findings  Elevate  Ice  Uric acid level in 2 weeks to reassess gout with recurrence of pain and swelling  Start steroid dose pack  Follow up with podiatry as scheduled

## 2022-08-08 NOTE — PROGRESS NOTES
"Chief Complaint  Toe Pain and Tinnitus    Subjective        eMrry Weller presents to [facility]  Toe Pain   The incident occurred more than 1 week ago (10 days ago). The incident occurred at home. There was no injury mechanism. The pain is present in the left toes (2nd toe left foot). The quality of the pain is described as aching. The pain is at a severity of 4/10. The pain is mild. The pain has been constant since onset. Pertinent negatives include no inability to bear weight, loss of motion, loss of sensation, muscle weakness, numbness or tingling. She reports no foreign bodies present. The symptoms are aggravated by palpation and movement. She has tried elevation, NSAIDs, non-weight bearing and rest (Podiatry, boot ) for the symptoms. The treatment provided mild relief.   Tinnitus  This is a new problem. The current episode started 1 to 4 weeks ago. The problem occurs intermittently. The problem has been unchanged. Associated symptoms include arthralgias, joint swelling and vertigo. Pertinent negatives include no abdominal pain, chest pain, chills, congestion, coughing, fatigue, fever, headaches, numbness, rash, sore throat, swollen glands, urinary symptoms or visual change. The symptoms are aggravated by twisting (turning head). She has tried nothing for the symptoms. The treatment provided no relief.       Objective   Vital Signs:  /80   Pulse 72   Temp 98 °F (36.7 °C)   Resp 22   Ht 157.5 cm (62\")   Wt 66.7 kg (147 lb)   SpO2 98%   BMI 26.89 kg/m²   Estimated body mass index is 26.89 kg/m² as calculated from the following:    Height as of this encounter: 157.5 cm (62\").    Weight as of this encounter: 66.7 kg (147 lb).          Physical Exam  Vitals and nursing note reviewed.   Constitutional:       General: She is not in acute distress.     Appearance: Normal appearance.   HENT:      Head: Normocephalic.      Right Ear: Ear canal and external ear normal. No " decreased hearing noted. No drainage. A middle ear effusion is present. There is no impacted cerumen. Tympanic membrane is bulging. Tympanic membrane has decreased mobility.      Left Ear: Ear canal and external ear normal. No decreased hearing noted. No drainage.  No middle ear effusion. There is no impacted cerumen. Tympanic membrane is not bulging.      Nose: Nose normal.   Eyes:      Extraocular Movements: Extraocular movements intact.      Conjunctiva/sclera: Conjunctivae normal.      Pupils: Pupils are equal, round, and reactive to light.   Cardiovascular:      Rate and Rhythm: Normal rate and regular rhythm.      Heart sounds: Normal heart sounds.   Pulmonary:      Effort: Pulmonary effort is normal.      Breath sounds: Normal breath sounds.   Musculoskeletal:         General: Swelling and tenderness present. No signs of injury.      Cervical back: Normal range of motion.      Right lower leg: No edema.      Left lower leg: No edema.        Feet:    Skin:     General: Skin is warm and dry.      Findings: No bruising or erythema.   Neurological:      General: No focal deficit present.      Mental Status: She is alert and oriented to person, place, and time.   Psychiatric:         Mood and Affect: Mood normal.         Behavior: Behavior normal.         Thought Content: Thought content normal.         Judgment: Judgment normal.        Result Review :    Common labs    Common Labsle 5/10/22 5/10/22 5/10/22 6/14/22 6/14/22    0928 0928 0928 0838 0838   Glucose  84      BUN  12      Creatinine  0.66      Sodium  139      Potassium  4.3      Chloride  103      Calcium  9.8      Albumin  4.30      Total Bilirubin  0.3      Alkaline Phosphatase  98      AST (SGOT)  22      ALT (SGPT)  15      WBC 4.72       Hemoglobin 11.8 (A)       Hematocrit 36.5       Platelets 241       Total Cholesterol    185    Triglycerides    117    HDL Cholesterol    51    LDL Cholesterol     113 (A)    Hemoglobin A1C     6.00 (A)   Uric  Acid   3.5     (A) Abnormal value                Uric Acid    Common Labsle 5/10/22   Uric Acid 3.5                     Assessment and Plan   Diagnoses and all orders for this visit:    1. Acute otitis media with effusion of right ear (Primary)  Assessment & Plan:  Take medications as directed  Drink plenty of fluids  Keep ears dry  Tylenol/Motrin PRN as directed  RTO or call PRN persistent or worsening symptoms      Orders:  -     doxycycline (ADOXA) 100 MG tablet; Take 1 tablet by mouth 2 (Two) Times a Day for 10 days.  Dispense: 20 tablet; Refill: 0    2. Pain and swelling of toe of left foot  Assessment & Plan:  Reviewed previous labs and x-ray of toe with no significant findings  Elevate  Ice  Uric acid level in 2 weeks to reassess gout with recurrence of pain and swelling  Start steroid dose pack  Follow up with podiatry as scheduled    Orders:  -     predniSONE (DELTASONE) 10 MG (21) dose pack; Use as directed on package  Dispense: 21 tablet; Refill: 0  -     Uric acid; Future           Follow Up   Return if symptoms worsen or fail to improve, for as scheduled upcoming with PCP.  Patient was given instructions and counseling regarding her condition or for health maintenance advice. Please see specific information pulled into the AVS if appropriate.

## 2022-08-09 ENCOUNTER — TREATMENT (OUTPATIENT)
Dept: PHYSICAL THERAPY | Facility: CLINIC | Age: 57
End: 2022-08-09

## 2022-08-09 DIAGNOSIS — M25.522 LEFT ELBOW PAIN: Primary | ICD-10-CM

## 2022-08-09 PROCEDURE — 97110 THERAPEUTIC EXERCISES: CPT | Performed by: PHYSICAL THERAPIST

## 2022-08-09 PROCEDURE — 97530 THERAPEUTIC ACTIVITIES: CPT | Performed by: PHYSICAL THERAPIST

## 2022-08-09 NOTE — PROGRESS NOTES
Physical Therapy Treatment Note/Discharge Note  VISIT: 9          Subjective    Merry Weller reports: her elbow is sometimes stiff and painful when she first wakes up, but improves with movement.    Quick DASH score: 9.09 (improved from 31.82 initially, and 18.18 at last reassessment)      Pre-treatment pain:  1  Post-treatment pain:  1      Objective    Treatment    Exercise 1  Exercise Name 1: Final HEP review and practice  Exercise 2  Exercise Name 2: goal review/DC assessment         Functional Testing  Functional Tests Options: Quick DASH       Assessment & Plan     Assessment    Assessment details: Pt is independent in HEP.  She has L elbow pain and stiffness upon waking which improves with movement.  She has achieved goals and is ready for transition to independent HEP/self-management.     Goals  Plan Goals: Plan Goals: 4 weeks  1)Pt. demonstrates independence and compliance with initial HEP-MET  2)Pt. reports reduction in pain intensity to no worse than 4/10 on NPRS-MET.  3)L  strength is improved by 5# without increase in pain-MET.    6 weeks  1)Pt. demonstrates independence in advanced HEP for ongoing improvement and self-management to prevent/reduce recurrence-MET.  2)L  strength is within age-related norms to allow participation in desired activities-MET.  3)Functional outcome measure is improved to 25 points or better-MET.     Plan  Plan details: DC to independent exercise.               Timed:  Manual Therapy:         mins  20358;  Therapeutic Exercise:    20     mins  27578;     Neuromuscular Mina:        mins  59521;    Therapeutic Activity:     10     mins  07658;     Gait Training:           mins  56208;     Ultrasound:          mins  84893;    Electrical Stimulation:         mins  48861 ( );    Untimed:  Electrical Stimulation:         mins  99034 ( );  Mechanical Traction:         mins  46223;  Canalith Repositioning       mins  02656    Timed Treatment:   30    mins   Total Treatment:     30   mins      Yasmeen Song, PT  Physical Therapist

## 2022-08-23 ENCOUNTER — LAB (OUTPATIENT)
Dept: LAB | Facility: HOSPITAL | Age: 57
End: 2022-08-23

## 2022-08-23 ENCOUNTER — OFFICE VISIT (OUTPATIENT)
Dept: FAMILY MEDICINE CLINIC | Facility: CLINIC | Age: 57
End: 2022-08-23

## 2022-08-23 VITALS
BODY MASS INDEX: 27.6 KG/M2 | TEMPERATURE: 97.8 F | WEIGHT: 150 LBS | DIASTOLIC BLOOD PRESSURE: 70 MMHG | HEIGHT: 62 IN | SYSTOLIC BLOOD PRESSURE: 110 MMHG | HEART RATE: 88 BPM | RESPIRATION RATE: 16 BRPM | OXYGEN SATURATION: 98 %

## 2022-08-23 DIAGNOSIS — G89.29 CHRONIC FOOT PAIN, LEFT: ICD-10-CM

## 2022-08-23 DIAGNOSIS — Z23 IMMUNIZATION DUE: ICD-10-CM

## 2022-08-23 DIAGNOSIS — M77.8 LEFT ELBOW TENDONITIS: ICD-10-CM

## 2022-08-23 DIAGNOSIS — M85.89 OSTEOPENIA OF MULTIPLE SITES: ICD-10-CM

## 2022-08-23 DIAGNOSIS — M79.672 CHRONIC FOOT PAIN, LEFT: ICD-10-CM

## 2022-08-23 DIAGNOSIS — M79.89 PAIN AND SWELLING OF TOE OF LEFT FOOT: ICD-10-CM

## 2022-08-23 DIAGNOSIS — M79.675 PAIN AND SWELLING OF TOE OF LEFT FOOT: ICD-10-CM

## 2022-08-23 DIAGNOSIS — R73.03 PREDIABETES: Primary | ICD-10-CM

## 2022-08-23 LAB — URATE SERPL-MCNC: 4.3 MG/DL (ref 2.4–5.7)

## 2022-08-23 PROCEDURE — 36415 COLL VENOUS BLD VENIPUNCTURE: CPT | Performed by: NURSE PRACTITIONER

## 2022-08-23 PROCEDURE — 99396 PREV VISIT EST AGE 40-64: CPT | Performed by: NURSE PRACTITIONER

## 2022-08-23 PROCEDURE — 84550 ASSAY OF BLOOD/URIC ACID: CPT | Performed by: NURSE PRACTITIONER

## 2022-08-23 RX ORDER — NAPROXEN 500 MG/1
TABLET ORAL
Qty: 60 TABLET | Refills: 2 | Status: SHIPPED | OUTPATIENT
Start: 2022-08-23

## 2022-10-19 ENCOUNTER — HOSPITAL ENCOUNTER (OUTPATIENT)
Dept: DIABETES SERVICES | Facility: HOSPITAL | Age: 57
Setting detail: RECURRING SERIES
Discharge: HOME | End: 2022-10-19

## 2022-10-19 NOTE — CONSULTS
Diabetes Education    Patient Name:  Merry Weller  YOB: 1965  MRN: [id number]  Admit Date:  10/19/2022        Patient attended scheduled pre diabetes education class--90 minute group session with RN, RD, CPT via zoom telehealth. Please see media tab for assessment and notes if you use EPIC. If you are not an EPIC user a copy of patient's assessment and notes will be sent per routine. Thank you.           Electronically signed by:  Jaqueline Padilla RN, [facility]  10/19/22 14:14 EDT

## 2022-11-07 ENCOUNTER — HOSPITAL ENCOUNTER (OUTPATIENT)
Dept: DIABETES SERVICES | Facility: HOSPITAL | Age: 57
Setting detail: RECURRING SERIES
Discharge: HOME | End: 2022-11-07

## 2022-11-07 NOTE — CONSULTS
DIABETES EDUCATION CONSULT, 30 minute follow up visit.Consent for treatment was given verbally.Please see media tab for assessment and notes if you use EPIC. If you are not an EPIC user a copy of patient's assessment and notes will be sent per routine. Thank you.

## 2023-02-17 ENCOUNTER — TRANSCRIBE ORDERS (OUTPATIENT)
Dept: ADMINISTRATIVE | Facility: HOSPITAL | Age: 58
End: 2023-02-17
Payer: COMMERCIAL

## 2023-02-17 DIAGNOSIS — Z12.31 VISIT FOR SCREENING MAMMOGRAM: Primary | ICD-10-CM

## 2023-03-13 ENCOUNTER — OFFICE VISIT (OUTPATIENT)
Dept: FAMILY MEDICINE CLINIC | Facility: CLINIC | Age: 58
End: 2023-03-13
Payer: COMMERCIAL

## 2023-03-13 VITALS
TEMPERATURE: 98.6 F | DIASTOLIC BLOOD PRESSURE: 76 MMHG | WEIGHT: 151.2 LBS | BODY MASS INDEX: 27.82 KG/M2 | HEART RATE: 73 BPM | HEIGHT: 62 IN | OXYGEN SATURATION: 99 % | SYSTOLIC BLOOD PRESSURE: 100 MMHG | RESPIRATION RATE: 21 BRPM

## 2023-03-13 DIAGNOSIS — H93.13 TINNITUS, BILATERAL: ICD-10-CM

## 2023-03-13 DIAGNOSIS — M54.50 PAIN IN RIGHT LUMBAR REGION OF BACK: ICD-10-CM

## 2023-03-13 DIAGNOSIS — R73.03 PREDIABETES: Primary | ICD-10-CM

## 2023-03-13 LAB
EXPIRATION DATE: NORMAL
HBA1C MFR BLD: 5.5 %
Lab: NORMAL

## 2023-03-13 PROCEDURE — 99214 OFFICE O/P EST MOD 30 MIN: CPT | Performed by: NURSE PRACTITIONER

## 2023-03-13 PROCEDURE — 83036 HEMOGLOBIN GLYCOSYLATED A1C: CPT | Performed by: NURSE PRACTITIONER

## 2023-03-13 NOTE — PROGRESS NOTES
"Chief Complaint  Prediabetes (6 month f/u) and Back Pain (She is complaining of right lower back pain. /)    Subjective          Merry Weller presents to [facility]  History of Present Illness  Patient is a 58 yo female. She is here for follow up on her pre diabetes. She has been exercising - walking 3-4 miles per day. She states she has cut back breads and carbs.   Her POCT glycohemoglobin has improved. It is down from 6.0% to 5.5%.   She is complaining of right lower back pain. Has been waxing and waning. She denies any known injury. She has been doing Yoga to try to stretch the muscles out. With some mild relief. She has rarely been taking naprosyn. With mild relief. \" I think it makes me have ringing in my ears\".  Patient stated that she is having ringing today she did take a naproxen this morning.  Is very mild.  She denies any ear pain, loss of hearing.              The following portions of the patient's history were reviewed and updated as appropriate: allergies, current medications, past family history, past medical history, past social history, past surgical history and problem list.    Review of Systems   Constitutional: Negative.    HENT: Negative.         Ringing in ear   Respiratory: Negative.    Cardiovascular: Negative.    Gastrointestinal: Negative.    Genitourinary: Negative.    Musculoskeletal: Positive for back pain.   Skin: Negative.    Allergic/Immunologic: Negative.    Neurological: Negative.    Hematological: Negative.    Psychiatric/Behavioral: Negative.          Objective   Vital Signs:   /76   Pulse 73   Temp 98.6 °F (37 °C) (Temporal)   Resp 21   Ht 157.5 cm (62.01\")   Wt 68.6 kg (151 lb 3.2 oz)   SpO2 99%   BMI 27.65 kg/m²    BMI is >= 25 and <30. (Overweight) The following options were offered after discussion;: exercise counseling/recommendations and nutrition counseling/recommendations      PHQ-2/9 Depression Screening  PHQ-9 Total " Score: 0            Physical Exam  Vitals reviewed.   HENT:      Right Ear: No middle ear effusion. Tympanic membrane is not erythematous or bulging.      Left Ear:  No middle ear effusion. Tympanic membrane is not erythematous or bulging.      Mouth/Throat:      Mouth: Mucous membranes are moist.   Cardiovascular:      Rate and Rhythm: Normal rate and regular rhythm.      Pulses: Normal pulses.   Pulmonary:      Effort: Pulmonary effort is normal.   Musculoskeletal:      Lumbar back: Tenderness present. No spasms or bony tenderness. Decreased range of motion. Negative right straight leg raise test and negative left straight leg raise test. No scoliosis.   Skin:     General: Skin is warm and dry.      Capillary Refill: Capillary refill takes less than 2 seconds.   Neurological:      Mental Status: She is alert and oriented to person, place, and time.   Psychiatric:         Mood and Affect: Mood normal.         Behavior: Behavior normal. Behavior is cooperative.        Result Review :                 Assessment and Plan    Diagnoses and all orders for this visit:    1. Prediabetes (Primary)  -     POC Glycosylated Hemoglobin (Hb A1C)    2. Pain in right lumbar region of back    3. Tinnitus, bilateral    Patient declines changing her NSAIDs.  She also declines referral to physical therapy at this time.  She will follow-up as needed.  May need referral to ENT.  She currently declines.    Follow Up   Return for annual fasting.  Patient was given instructions and counseling regarding her condition or for health maintenance advice. Please see specific information pulled into the AVS if appropriate.

## 2023-04-21 ENCOUNTER — HOSPITAL ENCOUNTER (OUTPATIENT)
Dept: MAMMOGRAPHY | Facility: HOSPITAL | Age: 58
Discharge: HOME | End: 2023-04-21
Admitting: OBSTETRICS & GYNECOLOGY
Payer: COMMERCIAL

## 2023-04-21 DIAGNOSIS — Z12.31 VISIT FOR SCREENING MAMMOGRAM: ICD-10-CM

## 2023-04-21 PROCEDURE — 77067 SCR MAMMO BI INCL CAD: CPT

## 2023-04-21 PROCEDURE — 77063 BREAST TOMOSYNTHESIS BI: CPT

## 2023-05-23 ENCOUNTER — OFFICE VISIT (OUTPATIENT)
Dept: FAMILY MEDICINE CLINIC | Facility: CLINIC | Age: 58
End: 2023-05-23

## 2023-05-23 VITALS
TEMPERATURE: 98 F | RESPIRATION RATE: 21 BRPM | WEIGHT: 149.2 LBS | BODY MASS INDEX: 27.46 KG/M2 | SYSTOLIC BLOOD PRESSURE: 110 MMHG | HEART RATE: 60 BPM | DIASTOLIC BLOOD PRESSURE: 72 MMHG | HEIGHT: 62 IN | OXYGEN SATURATION: 99 %

## 2023-05-23 DIAGNOSIS — M62.838 MUSCLE SPASM: ICD-10-CM

## 2023-05-23 DIAGNOSIS — M54.50 LUMBAR PAIN WITH RADIATION DOWN RIGHT LEG: Primary | ICD-10-CM

## 2023-05-23 DIAGNOSIS — M79.604 LUMBAR PAIN WITH RADIATION DOWN RIGHT LEG: Primary | ICD-10-CM

## 2023-05-23 DIAGNOSIS — M54.50 LOW BACK PAIN, UNSPECIFIED BACK PAIN LATERALITY, UNSPECIFIED CHRONICITY, UNSPECIFIED WHETHER SCIATICA PRESENT: ICD-10-CM

## 2023-05-23 LAB
BILIRUB BLD-MCNC: NEGATIVE MG/DL
CLARITY, POC: CLEAR
COLOR UR: YELLOW
EXPIRATION DATE: ABNORMAL
GLUCOSE UR STRIP-MCNC: NEGATIVE MG/DL
KETONES UR QL: ABNORMAL
LEUKOCYTE EST, POC: NEGATIVE
Lab: ABNORMAL
NITRITE UR-MCNC: NEGATIVE MG/ML
PH UR: 6 [PH] (ref 5–8)
PROT UR STRIP-MCNC: ABNORMAL MG/DL
RBC # UR STRIP: NEGATIVE /UL
SP GR UR: 1.03 (ref 1–1.03)
UROBILINOGEN UR QL: NORMAL

## 2023-05-23 RX ORDER — METHYLPREDNISOLONE ACETATE 80 MG/ML
80 INJECTION, SUSPENSION INTRA-ARTICULAR; INTRALESIONAL; INTRAMUSCULAR; SOFT TISSUE ONCE
Status: COMPLETED | OUTPATIENT
Start: 2023-05-23 | End: 2023-05-23

## 2023-05-23 RX ORDER — NAPROXEN 500 MG/1
TABLET ORAL
Qty: 60 TABLET | Refills: 2 | Status: SHIPPED | OUTPATIENT
Start: 2023-05-23

## 2023-05-23 RX ORDER — CYCLOBENZAPRINE HCL 10 MG
10 TABLET ORAL 3 TIMES DAILY PRN
Qty: 63 TABLET | Refills: 0 | Status: SHIPPED | OUTPATIENT
Start: 2023-05-23

## 2023-05-23 RX ADMIN — METHYLPREDNISOLONE ACETATE 80 MG: 80 INJECTION, SUSPENSION INTRA-ARTICULAR; INTRALESIONAL; INTRAMUSCULAR; SOFT TISSUE at 10:31

## 2023-05-23 NOTE — PROGRESS NOTES
Chief Complaint  Back Pain (Pt is having low back pain and also having what she thinks is sciatic pain on right side that started 2 wks ago. )    Subjective          Merry Weller presents to [facility]  History of Present Illness  Patient is a 57-year-old female.  She is here for same-day sick visit.  She is here for complaint of low back pain and also having what she thinks is sciatic pain on right side that started 2 wks ago.   She cannot recall a specific injury.     Back Pain  This is a recurrent problem. The current episode started 1 to 4 weeks ago. The pain is present in the lumbar spine. The quality of the pain is described as burning and aching. The pain radiates to the right thigh and right knee. The pain is at a severity of 4/10 (4/10 on leg pain and 5/5 on back ). The pain is moderate. The pain is worse during the day. The symptoms are aggravated by standing. Pertinent negatives include no bladder incontinence, bowel incontinence, dysuria, fever, numbness, pelvic pain or perianal numbness. She has tried NSAIDs (naproxen ) for the symptoms. The treatment provided mild relief.       The following portions of the patient's history were reviewed and updated as appropriate: allergies, current medications, past family history, past medical history, past social history, past surgical history and problem list.    Review of Systems   Constitutional: Negative for fever.   HENT: Negative.    Respiratory: Negative.    Cardiovascular: Negative.    Gastrointestinal: Negative.  Negative for bowel incontinence.   Genitourinary: Negative.  Negative for bladder incontinence, dysuria and pelvic pain.   Musculoskeletal: Positive for arthralgias, back pain and myalgias.   Skin: Negative.    Allergic/Immunologic: Negative.    Neurological: Negative.  Negative for numbness.   Hematological: Negative.    Psychiatric/Behavioral: Positive for sleep disturbance.         Objective   Vital  "Signs:   /72   Pulse 60   Temp 98 °F (36.7 °C) (Temporal)   Resp 21   Ht 157.5 cm (62.01\")   Wt 67.7 kg (149 lb 3.2 oz)   SpO2 99%   BMI 27.28 kg/m²    BMI is >= 25 and <30. (Overweight) The following options were offered after discussion;: exercise counseling/recommendations and nutrition counseling/recommendations                 Physical Exam  Vitals reviewed.   Constitutional:       Appearance: She is well-developed. She is not ill-appearing.   HENT:      Head: Normocephalic.      Mouth/Throat:      Mouth: Mucous membranes are moist.   Eyes:      Conjunctiva/sclera: Conjunctivae normal.      Pupils: Pupils are equal, round, and reactive to light.   Cardiovascular:      Rate and Rhythm: Normal rate and regular rhythm.      Heart sounds: Normal heart sounds.   Pulmonary:      Effort: Pulmonary effort is normal.      Breath sounds: Normal breath sounds.   Abdominal:      General: Bowel sounds are normal.      Palpations: Abdomen is soft.   Musculoskeletal:         General: Normal range of motion.      Cervical back: Normal range of motion.      Lumbar back: Spasms and tenderness present.   Lymphadenopathy:      Cervical: No cervical adenopathy.   Skin:     General: Skin is warm and dry.      Capillary Refill: Capillary refill takes less than 2 seconds.   Neurological:      Mental Status: She is alert and oriented to person, place, and time.   Psychiatric:         Mood and Affect: Mood normal.         Speech: Speech normal.         Behavior: Behavior normal. Behavior is cooperative.        Result Review :                 Assessment and Plan    Diagnoses and all orders for this visit:    1. Lumbar pain with radiation down right leg (Primary)  -     methylPREDNISolone acetate (DEPO-medrol) injection 80 mg  -     Ambulatory Referral to Physical Therapy Evaluate and treat  -     naproxen (NAPROSYN) 500 MG tablet; 1 PO BID prn with food  Dispense: 60 tablet; Refill: 2    2. Low back pain, unspecified back " pain laterality, unspecified chronicity, unspecified whether sciatica present  -     POCT urinalysis dipstick, automated  -     naproxen (NAPROSYN) 500 MG tablet; 1 PO BID prn with food  Dispense: 60 tablet; Refill: 2    3. Muscle spasm  -     cyclobenzaprine (FLEXERIL) 10 MG tablet; Take 1 tablet by mouth 3 (Three) Times a Day As Needed for Muscle Spasms. May take 1/2 to a whole tablet every 8 hours as needed  Dispense: 63 tablet; Refill: 0        Follow Up   Return in about 2 weeks (around 6/6/2023), or if symptoms worsen or fail to improve.  Patient was given instructions and counseling regarding her condition or for health maintenance advice. Please see specific information pulled into the AVS if appropriate.

## 2023-06-06 ENCOUNTER — TREATMENT (OUTPATIENT)
Dept: PHYSICAL THERAPY | Facility: CLINIC | Age: 58
End: 2023-06-06
Payer: COMMERCIAL

## 2023-06-06 DIAGNOSIS — M54.41 CHRONIC RIGHT-SIDED LOW BACK PAIN WITH RIGHT-SIDED SCIATICA: Primary | ICD-10-CM

## 2023-06-06 DIAGNOSIS — G89.29 CHRONIC RIGHT-SIDED LOW BACK PAIN WITH RIGHT-SIDED SCIATICA: Primary | ICD-10-CM

## 2023-06-06 PROCEDURE — 97161 PT EVAL LOW COMPLEX 20 MIN: CPT | Performed by: PHYSICAL THERAPIST

## 2023-06-06 PROCEDURE — 97140 MANUAL THERAPY 1/> REGIONS: CPT | Performed by: PHYSICAL THERAPIST

## 2023-06-06 PROCEDURE — 97110 THERAPEUTIC EXERCISES: CPT | Performed by: PHYSICAL THERAPIST
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
## 2023-06-19 ENCOUNTER — TREATMENT (OUTPATIENT)
Dept: PHYSICAL THERAPY | Facility: CLINIC | Age: 58
End: 2023-06-19
Payer: COMMERCIAL

## 2023-06-19 DIAGNOSIS — M54.41 CHRONIC RIGHT-SIDED LOW BACK PAIN WITH RIGHT-SIDED SCIATICA: Primary | ICD-10-CM

## 2023-06-19 DIAGNOSIS — G89.29 CHRONIC RIGHT-SIDED LOW BACK PAIN WITH RIGHT-SIDED SCIATICA: Primary | ICD-10-CM

## 2023-06-19 PROCEDURE — 97530 THERAPEUTIC ACTIVITIES: CPT | Performed by: PHYSICAL THERAPIST

## 2023-06-19 PROCEDURE — 97140 MANUAL THERAPY 1/> REGIONS: CPT | Performed by: PHYSICAL THERAPIST

## 2023-06-19 PROCEDURE — 97110 THERAPEUTIC EXERCISES: CPT | Performed by: PHYSICAL THERAPIST

## 2023-06-19 NOTE — PROGRESS NOTES
Physical Therapy Daily Progress Note    Patient: Merry Weller   : 1965  Diagnosis/ICD-10 Code:  Chronic right-sided low back pain with right-sided sciatica [M54.41, G89.29]  Referring practitioner: LUIS ANTONIO Person  Date of Initial Visit: Type: THERAPY  Noted: 2023  Today's Date: 2023  Patient seen for 2 sessions         Merry Weller reports feeling better since last visit.  Reports that she had difficulty sleeping the night of her last visit.  Notes that she was having pain ascending stairs.  Burning pain is gone.  No numbness or tingling in right leg.  Has about 1/10 pain in the low back.      Subjective     Objective   See Exercise, Manual, and Modality Logs for complete treatment.       Assessment/Plan  Focused visit on reducing lumbar spine and right leg soreness via manual therapy.  Combined with exercises to improve trunk strength, spine loading tolerance and right lower extremity neural structure tension loading tolerance.  Will f/u in 3 weeks.    *Started treatment at 3:41 PM       Manual Therapy:    12     mins  42091;  Therapeutic Exercise:    25     mins  05949;     Neuromuscular Mina:        mins  25662;    Therapeutic Activity:     10     mins  10451;     Gait Training:           mins  85875;     Ultrasound:          mins  85085;    Electrical Stimulation:         mins  72789 ( );  Dry Needling          mins self-pay    Timed Treatment:   47   mins   Total Treatment:     47   mins    Vargas Penn PT  Physical Therapist

## 2023-06-30 PROBLEM — M79.641 RIGHT HAND PAIN: Status: ACTIVE | Noted: 2023-06-30

## 2023-07-16 PROBLEM — U07.1 COVID-19: Status: RESOLVED | Noted: 2022-06-14 | Resolved: 2023-07-16

## 2023-07-16 PROBLEM — H65.191 ACUTE OTITIS MEDIA WITH EFFUSION OF RIGHT EAR: Status: RESOLVED | Noted: 2022-08-08 | Resolved: 2023-07-16

## 2023-07-16 PROBLEM — E78.5 BORDERLINE HYPERLIPIDEMIA: Status: ACTIVE | Noted: 2023-07-16

## 2023-07-16 PROBLEM — R05.9 COUGHING: Status: RESOLVED | Noted: 2021-12-29 | Resolved: 2023-07-16

## 2023-09-30 ENCOUNTER — OFFICE VISIT (OUTPATIENT)
Dept: FAMILY MEDICINE CLINIC | Facility: CLINIC | Age: 58
End: 2023-09-30
Payer: COMMERCIAL

## 2023-09-30 VITALS
HEART RATE: 76 BPM | HEIGHT: 62 IN | BODY MASS INDEX: 26.31 KG/M2 | WEIGHT: 143 LBS | TEMPERATURE: 98 F | OXYGEN SATURATION: 98 % | SYSTOLIC BLOOD PRESSURE: 116 MMHG | DIASTOLIC BLOOD PRESSURE: 80 MMHG

## 2023-09-30 DIAGNOSIS — J04.0 LARYNGITIS: Primary | ICD-10-CM

## 2023-09-30 DIAGNOSIS — G89.29 CHRONIC BILATERAL LOW BACK PAIN WITH LEFT-SIDED SCIATICA: ICD-10-CM

## 2023-09-30 DIAGNOSIS — M54.42 CHRONIC BILATERAL LOW BACK PAIN WITH LEFT-SIDED SCIATICA: ICD-10-CM

## 2023-09-30 PROCEDURE — 99213 OFFICE O/P EST LOW 20 MIN: CPT | Performed by: FAMILY MEDICINE

## 2023-09-30 RX ORDER — DOXYCYCLINE 100 MG/1
100 CAPSULE ORAL 2 TIMES DAILY
Qty: 14 CAPSULE | Refills: 0 | Status: SHIPPED | OUTPATIENT
Start: 2023-09-30

## 2023-09-30 RX ORDER — BENZONATATE 100 MG/1
100 CAPSULE ORAL 3 TIMES DAILY PRN
Qty: 30 CAPSULE | Refills: 1 | Status: SHIPPED | OUTPATIENT
Start: 2023-09-30

## 2023-09-30 RX ORDER — METHYLPREDNISOLONE 4 MG/1
TABLET ORAL
Qty: 21 TABLET | Refills: 0 | Status: SHIPPED | OUTPATIENT
Start: 2023-09-30

## 2023-09-30 NOTE — PROGRESS NOTES
Follow Up Office Visit      Patient Name: Merry Weller  : 1965   MRN: [id number]     Chief Complaint:    Chief Complaint   Patient presents with    Cough     Congestion in chest and head. Blowing green snot. Sciatic nerve pain.        History of Present Illness: Merry Weller is a 58 y.o. female who is here today to follow up with coughing, congestion, sciatic nerve pain, and change in her voice.  Patient says she has had this for over 1 week.  Patient says she just feels like she is getting worse overall.  Cough is worsening.  States her mucus is turning color.  Patient feels sick overall.  Patient also has chronic sciatic pain.  This current exacerbation has been going on for over 1 month.  She has had this before.  Went to PT and the pain improved after 1 or 2 sessions so she stopped.  Patient is not doing at home exercises currently    New injuries    Physical exam: Somatic function noted in lumbar and thoracic region.  Antalgic gait gait noted.      Subjective        I have reviewed and the following portions of the patient's history were updated as appropriate: past family history, past medical history, past social history, past surgical history and problem list.    Medications:     Current Outpatient Medications:     Calcium-Magnesium-Vitamin D (CALCIUM 1200+D3 PO), , Disp: , Rfl:     multivitamin with minerals tablet tablet, Take 1 tablet by mouth Daily., Disp: , Rfl:     naproxen (NAPROSYN) 500 MG tablet, 1 PO BID prn with food, Disp: 60 tablet, Rfl: 2    polyethylene glycol (MIRALAX) 17 g packet, , Disp: , Rfl:     benzonatate (TESSALON) 100 MG capsule, Take 1 capsule by mouth 3 (Three) Times a Day As Needed for Cough., Disp: 30 capsule, Rfl: 1    doxycycline (MONODOX) 100 MG capsule, Take 1 capsule by mouth 2 (Two) Times a Day., Disp: 14 capsule, Rfl: 0    methylPREDNISolone (MEDROL) 4 MG dose pack, Take as directed on package instructions., Disp: 21 tablet, Rfl: 0    Allergies:  "  Allergies   Allergen Reactions    Biaxin [Clarithromycin]     Penicillins        Objective     Physical Exam: Please see above  Vital Signs:   Vitals:    09/30/23 0957   BP: 116/80   Pulse: 76   Temp: 98 °F (36.7 °C)   SpO2: 98%   Weight: 64.9 kg (143 lb)   Height: 157.5 cm (62\")     Body mass index is 26.16 kg/m².          Assessment / Plan      Assessment/Plan:   Diagnoses and all orders for this visit:    1. Laryngitis (Primary)  -     benzonatate (TESSALON) 100 MG capsule; Take 1 capsule by mouth 3 (Three) Times a Day As Needed for Cough.  Dispense: 30 capsule; Refill: 1  -     methylPREDNISolone (MEDROL) 4 MG dose pack; Take as directed on package instructions.  Dispense: 21 tablet; Refill: 0  -     doxycycline (MONODOX) 100 MG capsule; Take 1 capsule by mouth 2 (Two) Times a Day.  Dispense: 14 capsule; Refill: 0    2. Chronic bilateral low back pain with left-sided sciatica    Patient likely has nerve root involvement of a bulging disc.  Would benefit from continued physical therapy but we went over some back exercises online and I encouraged her to restart her physical therapy program at home.  She can call for PT consult if things are not getting better.  I do expect her pain to improve with Medrol Dosepak as well.  May improve with some chiropractic work as well.  If pain does not improve, then get x-ray-MRI.  The patient will have to perform at least 6 weeks of conservative therapy before we get MRI.  Would refer patient to pain management in the future for consideration of injections or nerve ablation.    As far as her laryngitis goes-likely secondary infection given the amount of time that she has been sick.  Response to steroids and antibiotics is variable for laryngitis and bronchitis.  We will try Tessalon Perles for cough control.  Medication sent in as above.  If symptoms are not totally resolved-I would give symptoms an additional 1 to 2 weeks to see how they go.  Postviral cough can last up to 1 " or 2 months.    Follow Up:   Return if symptoms worsen or fail to improve.    Justus Guerra DO  [facility] [place]

## 2023-10-16 ENCOUNTER — TELEPHONE (OUTPATIENT)
Dept: FAMILY MEDICINE CLINIC | Facility: CLINIC | Age: 58
End: 2023-10-16
Payer: COMMERCIAL

## 2023-10-16 NOTE — TELEPHONE ENCOUNTER
I received a message from Dr. Trujillo who is seen the patient for her back pain.  He reports that her back pain is not controlled and she could use a different medication for pain control.  There are some options that we could go over with the patient, but she will need to schedule a visit to go over those options.  If she would like to discuss options soon, please help her schedule visit.  Otherwise, we can discuss this at her next visit.  I just wanted to reach out since Dr. Trujillo mentioned this issue with her pain control.

## 2024-03-18 ENCOUNTER — TRANSCRIBE ORDERS (OUTPATIENT)
Dept: ADMINISTRATIVE | Facility: HOSPITAL | Age: 59
End: 2024-03-18
Payer: COMMERCIAL

## 2024-03-18 DIAGNOSIS — Z12.31 VISIT FOR SCREENING MAMMOGRAM: Primary | ICD-10-CM

## 2024-05-23 ENCOUNTER — HOSPITAL ENCOUNTER (OUTPATIENT)
Dept: MAMMOGRAPHY | Facility: HOSPITAL | Age: 59
Discharge: HOME | End: 2024-05-23
Admitting: OBSTETRICS & GYNECOLOGY
Payer: COMMERCIAL

## 2024-05-23 DIAGNOSIS — Z12.31 VISIT FOR SCREENING MAMMOGRAM: ICD-10-CM

## 2024-05-23 PROCEDURE — 77067 SCR MAMMO BI INCL CAD: CPT

## 2024-05-23 PROCEDURE — 77063 BREAST TOMOSYNTHESIS BI: CPT

## 2024-06-14 ENCOUNTER — HOSPITAL ENCOUNTER (OUTPATIENT)
Dept: MAMMOGRAPHY | Facility: HOSPITAL | Age: 59
Discharge: HOME | End: 2024-06-14
Payer: COMMERCIAL

## 2024-06-14 DIAGNOSIS — Z12.31 ENCOUNTER FOR SCREENING MAMMOGRAM FOR MALIGNANT NEOPLASM OF BREAST: ICD-10-CM

## 2024-08-12 ENCOUNTER — LAB (OUTPATIENT)
Dept: LAB | Facility: HOSPITAL | Age: 59
End: 2024-08-12
Payer: COMMERCIAL

## 2024-08-12 ENCOUNTER — OFFICE VISIT (OUTPATIENT)
Dept: FAMILY MEDICINE CLINIC | Facility: CLINIC | Age: 59
End: 2024-08-12
Payer: COMMERCIAL

## 2024-08-12 VITALS
RESPIRATION RATE: 20 BRPM | OXYGEN SATURATION: 96 % | BODY MASS INDEX: 27.07 KG/M2 | SYSTOLIC BLOOD PRESSURE: 110 MMHG | DIASTOLIC BLOOD PRESSURE: 80 MMHG | HEART RATE: 100 BPM | TEMPERATURE: 98 F | WEIGHT: 148 LBS

## 2024-08-12 DIAGNOSIS — Z00.00 ANNUAL PHYSICAL EXAM: Primary | ICD-10-CM

## 2024-08-12 LAB
ALBUMIN SERPL-MCNC: 4.4 G/DL (ref 3.5–5.2)
ALBUMIN/GLOB SERPL: 1.6 G/DL
ALP SERPL-CCNC: 101 U/L (ref 39–117)
ALT SERPL W P-5'-P-CCNC: 19 U/L (ref 1–33)
ANION GAP SERPL CALCULATED.3IONS-SCNC: 10 MMOL/L (ref 5–15)
AST SERPL-CCNC: 24 U/L (ref 1–32)
BACTERIA UR QL AUTO: NORMAL /HPF
BILIRUB SERPL-MCNC: 0.3 MG/DL (ref 0–1.2)
BILIRUB UR QL STRIP: NEGATIVE
BUN SERPL-MCNC: 12 MG/DL (ref 6–20)
BUN/CREAT SERPL: 14.6 (ref 7–25)
CALCIUM SPEC-SCNC: 9.9 MG/DL (ref 8.6–10.5)
CHLORIDE SERPL-SCNC: 105 MMOL/L (ref 98–107)
CHOLEST SERPL-MCNC: 179 MG/DL (ref 0–200)
CLARITY UR: CLEAR
CO2 SERPL-SCNC: 26 MMOL/L (ref 22–29)
COLOR UR: YELLOW
CREAT SERPL-MCNC: 0.82 MG/DL (ref 0.57–1)
DEPRECATED RDW RBC AUTO: 42.1 FL (ref 37–54)
EGFRCR SERPLBLD CKD-EPI 2021: 83.5 ML/MIN/1.73
ERYTHROCYTE [DISTWIDTH] IN BLOOD BY AUTOMATED COUNT: 13.1 % (ref 12.3–15.4)
GLOBULIN UR ELPH-MCNC: 2.7 GM/DL
GLUCOSE SERPL-MCNC: 96 MG/DL (ref 65–99)
GLUCOSE UR STRIP-MCNC: NEGATIVE MG/DL
HBA1C MFR BLD: 5.7 % (ref 4.8–5.6)
HCT VFR BLD AUTO: 39.2 % (ref 34–46.6)
HDLC SERPL-MCNC: 54 MG/DL (ref 40–60)
HGB BLD-MCNC: 13.3 G/DL (ref 12–15.9)
HGB UR QL STRIP.AUTO: NEGATIVE
HYALINE CASTS UR QL AUTO: NORMAL /LPF
KETONES UR QL STRIP: NEGATIVE
LDLC SERPL CALC-MCNC: 108 MG/DL (ref 0–100)
LDLC/HDLC SERPL: 1.96 {RATIO}
LEUKOCYTE ESTERASE UR QL STRIP.AUTO: NEGATIVE
MCH RBC QN AUTO: 30 PG (ref 26.6–33)
MCHC RBC AUTO-ENTMCNC: 33.9 G/DL (ref 31.5–35.7)
MCV RBC AUTO: 88.5 FL (ref 79–97)
NITRITE UR QL STRIP: NEGATIVE
PH UR STRIP.AUTO: 6.5 [PH] (ref 5–8)
PLATELET # BLD AUTO: 237 10*3/MM3 (ref 140–450)
PMV BLD AUTO: 10.6 FL (ref 6–12)
POTASSIUM SERPL-SCNC: 4.2 MMOL/L (ref 3.5–5.2)
PROT SERPL-MCNC: 7.1 G/DL (ref 6–8.5)
PROT UR QL STRIP: NEGATIVE
RBC # BLD AUTO: 4.43 10*6/MM3 (ref 3.77–5.28)
RBC # UR STRIP: NORMAL /HPF
REF LAB TEST METHOD: NORMAL
SODIUM SERPL-SCNC: 141 MMOL/L (ref 136–145)
SP GR UR STRIP: 1.02 (ref 1–1.03)
SQUAMOUS #/AREA URNS HPF: NORMAL /HPF
TRIGL SERPL-MCNC: 95 MG/DL (ref 0–150)
TSH SERPL DL<=0.05 MIU/L-ACNC: 2.23 UIU/ML (ref 0.27–4.2)
UROBILINOGEN UR QL STRIP: NORMAL
VLDLC SERPL-MCNC: 17 MG/DL (ref 5–40)
WBC # UR STRIP: NORMAL /HPF
WBC NRBC COR # BLD AUTO: 4.44 10*3/MM3 (ref 3.4–10.8)

## 2024-08-12 PROCEDURE — 80061 LIPID PANEL: CPT | Performed by: STUDENT IN AN ORGANIZED HEALTH CARE EDUCATION/TRAINING PROGRAM

## 2024-08-12 PROCEDURE — 99396 PREV VISIT EST AGE 40-64: CPT | Performed by: STUDENT IN AN ORGANIZED HEALTH CARE EDUCATION/TRAINING PROGRAM

## 2024-08-12 PROCEDURE — 82607 VITAMIN B-12: CPT | Performed by: STUDENT IN AN ORGANIZED HEALTH CARE EDUCATION/TRAINING PROGRAM

## 2024-08-12 PROCEDURE — 80050 GENERAL HEALTH PANEL: CPT | Performed by: STUDENT IN AN ORGANIZED HEALTH CARE EDUCATION/TRAINING PROGRAM

## 2024-08-12 PROCEDURE — 83036 HEMOGLOBIN GLYCOSYLATED A1C: CPT | Performed by: STUDENT IN AN ORGANIZED HEALTH CARE EDUCATION/TRAINING PROGRAM

## 2024-08-12 PROCEDURE — 82306 VITAMIN D 25 HYDROXY: CPT | Performed by: STUDENT IN AN ORGANIZED HEALTH CARE EDUCATION/TRAINING PROGRAM

## 2024-08-12 PROCEDURE — 81001 URINALYSIS AUTO W/SCOPE: CPT | Performed by: STUDENT IN AN ORGANIZED HEALTH CARE EDUCATION/TRAINING PROGRAM

## 2024-08-12 NOTE — PROGRESS NOTES
Chief Complaint  Annual Exam    History of Present Illness      Annual exam  Colonoscopy up to date.   Mammogram up to date   Pap smear scheduled  Counseled on diet and exercise, she is walking dog.   Recommend dental and eye exam  hiv hep c sti screening: she is not interested.   Vaccines recommended:  covid vaccine  flu  Hepatitis B      The following portions of the patient's history were reviewed and updated as appropriate: allergies, current medications, past family history, past medical history, past social history, past surgical history, and problem list.    OBJECTIVE:  /80 (BP Location: Right arm, Patient Position: Sitting, Cuff Size: Adult)   Pulse 100   Temp 98 °F (36.7 °C) (Infrared)   Resp 20   Wt 67.1 kg (148 lb)   SpO2 96%   BMI 27.07 kg/m²       Physical Exam  Constitutional:       General: She is not in acute distress.     Appearance: Normal appearance.   HENT:      Head: Normocephalic and atraumatic.   Eyes:      Extraocular Movements: Extraocular movements intact.   Cardiovascular:      Rate and Rhythm: Normal rate and regular rhythm.      Heart sounds: No murmur heard.  Pulmonary:      Effort: Pulmonary effort is normal. No respiratory distress.      Breath sounds: Normal breath sounds. No stridor. No wheezing, rhonchi or rales.   Skin:     Findings: No rash.   Neurological:      General: No focal deficit present.      Mental Status: She is alert.   Psychiatric:         Mood and Affect: Mood normal.                    Assessment and Plan   Diagnoses and all orders for this visit:    1. Annual physical exam (Primary)  -     CBC (No Diff); Future  -     Comprehensive Metabolic Panel; Future  -     Hemoglobin A1c; Future  -     Lipid Panel; Future  -     TSH Rfx On Abnormal To Free T4; Future  -     Vitamin D,25-Hydroxy; Future  -     Vitamin B12; Future      Annual exam  Colonoscopy up to date.   Mammogram up to date   Pap smear scheduled  Counseled on diet and exercise, she is walking  dog.   Recommend dental and eye exam  hiv hep c sti screening: she is not interested.   Vaccines recommended:  covid vaccine  flu  Hepatitis B    Return in about 1 year (around 8/12/2025) for Annual physical.       Mary Carmen Ortiz D.O.  [facility] [place]

## 2024-08-13 LAB
25(OH)D3 SERPL-MCNC: 53.8 NG/ML (ref 30–100)
VIT B12 BLD-MCNC: 787 PG/ML (ref 211–946)

## 2024-08-13 NOTE — PROGRESS NOTES
Please let the patient know that cholesterol is mildly elevated which is a risk factor for heart attack stroke or vascular complications over time. It is improved from last check. Recommend diet with lean meat fish and vegetables and decreased sugar/carbs with daily exercise. Can refer to phone dietitian if this helps let me know. We can also start a medication called lipitor which can lower this risk.     Please let the patient know that the diabetic test (a1c) is elevated into the prediabetic not diabetic range. Recommend diet with lean meat fish and vegetables and decreased sugar/carbs with daily exercise. Can refer to phone dietitian if this helps let me know. Recommend to recheck this in 3-6 months.     Blood in urine resolved signifying no kidney stones.       Work physical form filled out and placed in fax pile.

## 2024-08-15 ENCOUNTER — TELEPHONE (OUTPATIENT)
Dept: FAMILY MEDICINE CLINIC | Facility: CLINIC | Age: 59
End: 2024-08-15
Payer: COMMERCIAL

## 2024-08-15 NOTE — TELEPHONE ENCOUNTER
Hub and front can relay    Tried to reach the pt in regards to the provider message below:    Mary Carmen Ortiz  Please let the patient know that cholesterol is mildly elevated which is a risk factor for heart attack stroke or vascular complications over time. It is improved from last check. Recommend diet with lean meat fish and vegetables and decreased sugar/carbs with daily exercise. Can refer to phone dietitian if this helps let me know. We can also start a medication called lipitor which can lower this risk.     Please let the patient know that the diabetic test (a1c) is elevated into the prediabetic not diabetic range. Recommend diet with lean meat fish and vegetables and decreased sugar/carbs with daily exercise. Can refer to phone dietitian if this helps let me know. Recommend to recheck this in 3-6 months.     Blood in urine resolved signifying no kidney stones.       Work physical form filled out and placed in fax pile.

## 2024-12-18 ENCOUNTER — APPOINTMENT (OUTPATIENT)
Dept: GENERAL RADIOLOGY | Facility: HOSPITAL | Age: 59
End: 2024-12-18
Payer: COMMERCIAL

## 2024-12-18 ENCOUNTER — HOSPITAL ENCOUNTER (EMERGENCY)
Facility: HOSPITAL | Age: 59
Discharge: HOME | End: 2024-12-18
Attending: EMERGENCY MEDICINE | Admitting: EMERGENCY MEDICINE
Payer: COMMERCIAL

## 2024-12-18 VITALS
RESPIRATION RATE: 18 BRPM | OXYGEN SATURATION: 97 % | WEIGHT: 143 LBS | BODY MASS INDEX: 26.31 KG/M2 | DIASTOLIC BLOOD PRESSURE: 62 MMHG | HEIGHT: 62 IN | HEART RATE: 54 BPM | TEMPERATURE: 97.6 F | SYSTOLIC BLOOD PRESSURE: 126 MMHG

## 2024-12-18 DIAGNOSIS — R42 VERTIGO: Primary | ICD-10-CM

## 2024-12-18 DIAGNOSIS — H93.13 TINNITUS AURIUM, BILATERAL: ICD-10-CM

## 2024-12-18 LAB
ALBUMIN SERPL-MCNC: 4.2 G/DL (ref 3.5–5.2)
ALBUMIN/GLOB SERPL: 1.4 G/DL
ALP SERPL-CCNC: 111 U/L (ref 39–117)
ALT SERPL W P-5'-P-CCNC: 18 U/L (ref 1–33)
ANION GAP SERPL CALCULATED.3IONS-SCNC: 11 MMOL/L (ref 5–15)
AST SERPL-CCNC: 22 U/L (ref 1–32)
BACTERIA UR QL AUTO: ABNORMAL /HPF
BASOPHILS # BLD AUTO: 0.03 10*3/MM3 (ref 0–0.2)
BASOPHILS NFR BLD AUTO: 0.6 % (ref 0–1.5)
BILIRUB SERPL-MCNC: 0.2 MG/DL (ref 0–1.2)
BILIRUB UR QL STRIP: NEGATIVE
BUN SERPL-MCNC: 13 MG/DL (ref 6–20)
BUN/CREAT SERPL: 17.1 (ref 7–25)
CALCIUM SPEC-SCNC: 9.4 MG/DL (ref 8.6–10.5)
CHLORIDE SERPL-SCNC: 105 MMOL/L (ref 98–107)
CLARITY UR: ABNORMAL
CO2 SERPL-SCNC: 23 MMOL/L (ref 22–29)
COLOR UR: YELLOW
CREAT SERPL-MCNC: 0.76 MG/DL (ref 0.57–1)
DEPRECATED RDW RBC AUTO: 47 FL (ref 37–54)
EGFRCR SERPLBLD CKD-EPI 2021: 91 ML/MIN/1.73
EOSINOPHIL # BLD AUTO: 0.05 10*3/MM3 (ref 0–0.4)
EOSINOPHIL NFR BLD AUTO: 1 % (ref 0.3–6.2)
ERYTHROCYTE [DISTWIDTH] IN BLOOD BY AUTOMATED COUNT: 14.6 % (ref 12.3–15.4)
GEN 5 1HR TROPONIN T REFLEX: <6 NG/L
GLOBULIN UR ELPH-MCNC: 3 GM/DL
GLUCOSE SERPL-MCNC: 115 MG/DL (ref 65–99)
GLUCOSE UR STRIP-MCNC: NEGATIVE MG/DL
HCT VFR BLD AUTO: 45.1 % (ref 34–46.6)
HGB BLD-MCNC: 14.9 G/DL (ref 12–15.9)
HGB UR QL STRIP.AUTO: NEGATIVE
HOLD SPECIMEN: NORMAL
HYALINE CASTS UR QL AUTO: ABNORMAL /LPF
IMM GRANULOCYTES # BLD AUTO: 0.01 10*3/MM3 (ref 0–0.05)
IMM GRANULOCYTES NFR BLD AUTO: 0.2 % (ref 0–0.5)
KETONES UR QL STRIP: NEGATIVE
LEUKOCYTE ESTERASE UR QL STRIP.AUTO: ABNORMAL
LYMPHOCYTES # BLD AUTO: 1.89 10*3/MM3 (ref 0.7–3.1)
LYMPHOCYTES NFR BLD AUTO: 39.5 % (ref 19.6–45.3)
MAGNESIUM SERPL-MCNC: 2 MG/DL (ref 1.6–2.6)
MCH RBC QN AUTO: 28.8 PG (ref 26.6–33)
MCHC RBC AUTO-ENTMCNC: 33 G/DL (ref 31.5–35.7)
MCV RBC AUTO: 87.2 FL (ref 79–97)
MONOCYTES # BLD AUTO: 0.41 10*3/MM3 (ref 0.1–0.9)
MONOCYTES NFR BLD AUTO: 8.6 % (ref 5–12)
NEUTROPHILS NFR BLD AUTO: 2.39 10*3/MM3 (ref 1.7–7)
NEUTROPHILS NFR BLD AUTO: 50.1 % (ref 42.7–76)
NITRITE UR QL STRIP: NEGATIVE
NRBC BLD AUTO-RTO: 0 /100 WBC (ref 0–0.2)
PH UR STRIP.AUTO: 7.5 [PH] (ref 5–8)
PLATELET # BLD AUTO: 215 10*3/MM3 (ref 140–450)
PMV BLD AUTO: 9.9 FL (ref 6–12)
POTASSIUM SERPL-SCNC: 3.8 MMOL/L (ref 3.5–5.2)
PROT SERPL-MCNC: 7.2 G/DL (ref 6–8.5)
PROT UR QL STRIP: ABNORMAL
QT INTERVAL: 408 MS
QTC INTERVAL: 417 MS
RBC # BLD AUTO: 5.17 10*6/MM3 (ref 3.77–5.28)
RBC # UR STRIP: ABNORMAL /HPF
REF LAB TEST METHOD: ABNORMAL
SODIUM SERPL-SCNC: 139 MMOL/L (ref 136–145)
SP GR UR STRIP: 1.02 (ref 1–1.03)
SQUAMOUS #/AREA URNS HPF: ABNORMAL /HPF
TROPONIN T NUMERIC DELTA: NORMAL
TROPONIN T SERPL HS-MCNC: <6 NG/L
UROBILINOGEN UR QL STRIP: ABNORMAL
WBC # UR STRIP: ABNORMAL /HPF
WBC NRBC COR # BLD AUTO: 4.78 10*3/MM3 (ref 3.4–10.8)
WHOLE BLOOD HOLD COAG: NORMAL
WHOLE BLOOD HOLD SPECIMEN: NORMAL

## 2024-12-18 PROCEDURE — 84484 ASSAY OF TROPONIN QUANT: CPT | Performed by: EMERGENCY MEDICINE

## 2024-12-18 PROCEDURE — 96375 TX/PRO/DX INJ NEW DRUG ADDON: CPT

## 2024-12-18 PROCEDURE — 80053 COMPREHEN METABOLIC PANEL: CPT | Performed by: EMERGENCY MEDICINE

## 2024-12-18 PROCEDURE — 93005 ELECTROCARDIOGRAM TRACING: CPT | Performed by: EMERGENCY MEDICINE

## 2024-12-18 PROCEDURE — 36415 COLL VENOUS BLD VENIPUNCTURE: CPT

## 2024-12-18 PROCEDURE — 25010000002 DEXAMETHASONE PER 1 MG: Performed by: EMERGENCY MEDICINE

## 2024-12-18 PROCEDURE — 99284 EMERGENCY DEPT VISIT MOD MDM: CPT

## 2024-12-18 PROCEDURE — 96374 THER/PROPH/DIAG INJ IV PUSH: CPT

## 2024-12-18 PROCEDURE — 71045 X-RAY EXAM CHEST 1 VIEW: CPT

## 2024-12-18 PROCEDURE — 83735 ASSAY OF MAGNESIUM: CPT | Performed by: EMERGENCY MEDICINE

## 2024-12-18 PROCEDURE — 81001 URINALYSIS AUTO W/SCOPE: CPT | Performed by: EMERGENCY MEDICINE

## 2024-12-18 PROCEDURE — 25010000002 DIAZEPAM PER 5 MG: Performed by: EMERGENCY MEDICINE

## 2024-12-18 PROCEDURE — 85025 COMPLETE CBC W/AUTO DIFF WBC: CPT | Performed by: EMERGENCY MEDICINE

## 2024-12-18 RX ORDER — DEXAMETHASONE SODIUM PHOSPHATE 10 MG/ML
6 INJECTION INTRAMUSCULAR; INTRAVENOUS ONCE
Status: COMPLETED | OUTPATIENT
Start: 2024-12-18 | End: 2024-12-18

## 2024-12-18 RX ORDER — MECLIZINE HYDROCHLORIDE 25 MG/1
25 TABLET ORAL 3 TIMES DAILY PRN
Qty: 30 TABLET | Refills: 0 | Status: SHIPPED | OUTPATIENT
Start: 2024-12-18

## 2024-12-18 RX ORDER — SODIUM CHLORIDE 0.9 % (FLUSH) 0.9 %
10 SYRINGE (ML) INJECTION AS NEEDED
Status: DISCONTINUED | OUTPATIENT
Start: 2024-12-18 | End: 2024-12-18 | Stop reason: HOSPADM

## 2024-12-18 RX ORDER — MECLIZINE HYDROCHLORIDE 25 MG/1
25 TABLET ORAL ONCE
Status: COMPLETED | OUTPATIENT
Start: 2024-12-18 | End: 2024-12-18

## 2024-12-18 RX ORDER — DIAZEPAM 10 MG/2ML
2.5 INJECTION, SOLUTION INTRAMUSCULAR; INTRAVENOUS ONCE
Status: COMPLETED | OUTPATIENT
Start: 2024-12-18 | End: 2024-12-18

## 2024-12-18 RX ADMIN — MECLIZINE HYDROCHLORIDE 25 MG: 25 TABLET ORAL at 10:53

## 2024-12-18 RX ADMIN — DIAZEPAM 2.5 MG: 5 INJECTION INTRAMUSCULAR; INTRAVENOUS at 10:48

## 2024-12-18 RX ADMIN — DEXAMETHASONE SODIUM PHOSPHATE 6 MG: 10 INJECTION INTRAMUSCULAR; INTRAVENOUS at 10:50

## 2024-12-18 NOTE — ED PROVIDER NOTES
Subjective   History of Present Illness  This is a very pleasant 59-year-old female  accompanied by her .  Followed by [facility].  She works as an  generally is active walks her dog about an hour every morning.  Not a smoker or drinker and does not have high blood pressure high cholesterol or diabetes.    No relatives with first-degree history of stroke but does have a daughter with epilepsy.    She presents to the emergency department today with abrupt onset of dizziness and room spinning around when she tried to get out of bed this morning at about 645.  She was fine when she went to bed last night and got up about 1:00 still feeling fine at that time.    She is never had symptoms like this in the past.    She is talked to her niece who is an ER doctor suggested she come and get evaluated but likely vertigo.    Interestingly she has had tinnitus for several years but is not seeing ENT for this but did have a hearing test about 6 or 7 years ago.  She has had no change in her hearing that she can think of no headache or focal weakness with this.    She has had no fevers chills runny nose sore throat or cough.  With her symptoms this morning she felt like she was out of balance had to crawl and hold on the wall she vomited a couple times and had dry heaves as well.    No recent illness such as fevers chills runny nose sore throat or cough.  Bowel movements and urine have been normal.  No recent change in any medications which she is on a very simple regiment.    All other systems are reviewed and are negative except as noted above.        Review of Systems   All other systems reviewed and are negative.      Past Medical History:   Diagnosis Date    Allergic 1990    Penicillin & Biaxin    Ankle injury     COVID-19 06/14/2022    Has 5/24/2022    Diverticulosis 2002    GERD (gastroesophageal reflux disease) 1985    Hematoma of right thigh     Left tennis elbow     Low back pain 2012     Degenerative Disc Disease    Sinusitis     Tendonitis, bicipital        Allergies   Allergen Reactions    Biaxin [Clarithromycin]     Penicillins        Past Surgical History:   Procedure Laterality Date    ADENOIDECTOMY  8/10/1986     SECTION      Prenatal Ed 24-28 Week F/U Elective Caesarean Section     CHOLECYSTECTOMY      COLONOSCOPY      COMPLETE-· 2012  repeat 10 yrs    EYE SURGERY      Repair Of Blepharoptosis Both Upper Eyelids    TONSILLECTOMY  8/10/1986       Family History   Problem Relation Age of Onset    Hypertension Mother     Osteoporosis Mother     Endometriosis Mother     Anxiety disorder Mother         Panic Attack 1 time    COPD Mother     Miscarriages / Stillbirths Mother         1 miscarriage    Skin cancer Father     Arthritis Father     Hyperlipidemia Father     Breast cancer Maternal Aunt 52    Cancer Maternal Aunt         Breast cancer    Coronary artery disease Maternal Grandmother     Coronary artery disease Paternal Grandmother     Hyperlipidemia Paternal Grandmother     Breast cancer Cousin 42    Prostate cancer Maternal Uncle     Alcohol abuse Maternal Uncle     Esophageal cancer Maternal Uncle     Alcohol abuse Maternal Uncle     Cancer Maternal Uncle         Throat and tongue cancer    Alcohol abuse Maternal Grandfather     Diabetes Maternal Grandfather     Early death Paternal Grandfather          at 48 on operating table (gallbladder removal). Possible heart attack - cause not confirmed    Alcohol abuse Maternal Aunt     COPD Maternal Aunt     Anxiety disorder Daughter         Anxiety-mild    Asthma Maternal Aunt     Cancer Maternal Uncle         Pancreatic and liver cancer    Cancer Maternal Uncle         Prostate cancer    Miscarriages / Stillbirths Sister         2 miscarriages    Ovarian cancer Neg Hx        Social History     Socioeconomic History    Marital status:    Tobacco Use    Smoking status: Never     Passive exposure: Never    Smokeless  tobacco: Never   Vaping Use    Vaping status: Never Used   Substance and Sexual Activity    Alcohol use: No    Drug use: No    Sexual activity: Yes     Partners: Male     Birth control/protection: Post-menopausal, Surgical     Comment:  had vasectomy           Objective   Physical Exam  Vitals and nursing note reviewed.   Constitutional:       Comments: This is a pleasant 59-year-old female alert and oriented GCS is 15.   HENT:      Head: Normocephalic and atraumatic.      Right Ear: Tympanic membrane, ear canal and external ear normal.      Left Ear: Tympanic membrane, ear canal and external ear normal.      Nose: Nose normal.      Mouth/Throat:      Mouth: Mucous membranes are moist.      Pharynx: Oropharynx is clear.   Eyes:      Extraocular Movements: Extraocular movements intact.      Conjunctiva/sclera: Conjunctivae normal.      Pupils: Pupils are equal, round, and reactive to light.   Neck:      Vascular: No carotid bruit.   Cardiovascular:      Rate and Rhythm: Normal rate and regular rhythm.      Pulses: Normal pulses.      Heart sounds: Normal heart sounds.   Pulmonary:      Effort: Pulmonary effort is normal.      Breath sounds: Normal breath sounds.   Abdominal:      Comments: Positive bowel sounds soft nontender no organomegaly, masses, or guarding   Musculoskeletal:      Cervical back: Normal range of motion and neck supple. No rigidity or tenderness.      Comments: Equal full pulses without edema, synovitis, rash, or venous cords   Lymphadenopathy:      Cervical: No cervical adenopathy.   Skin:     General: Skin is warm and dry.      Capillary Refill: Capillary refill takes less than 2 seconds.   Neurological:      Comments: Face symmetric, voice soft, tongue midline.  Vision, hearing, speech preserved.  No pronator drift.  Rapid alternating movements show no dysdiadochokinesis and cerebellar shows normal finger to ear.  Knee jerks are 2+ bilaterally.  She does have 3-4 beats right lateral gaze  nystagmus and 1 beat to the left.         Procedures           ED Course                                           Recent Results (from the past 24 hours)   ECG 12 Lead ED Triage Standing Order; Weak / Dizzy / AMS    Collection Time: 12/18/24  9:15 AM   Result Value Ref Range    QT Interval 408 ms    QTC Interval 417 ms   Comprehensive Metabolic Panel    Collection Time: 12/18/24  9:30 AM    Specimen: Blood   Result Value Ref Range    Glucose 115 (H) 65 - 99 mg/dL    BUN 13 6 - 20 mg/dL    Creatinine 0.76 0.57 - 1.00 mg/dL    Sodium 139 136 - 145 mmol/L    Potassium 3.8 3.5 - 5.2 mmol/L    Chloride 105 98 - 107 mmol/L    CO2 23.0 22.0 - 29.0 mmol/L    Calcium 9.4 8.6 - 10.5 mg/dL    Total Protein 7.2 6.0 - 8.5 g/dL    Albumin 4.2 3.5 - 5.2 g/dL    ALT (SGPT) 18 1 - 33 U/L    AST (SGOT) 22 1 - 32 U/L    Alkaline Phosphatase 111 39 - 117 U/L    Total Bilirubin 0.2 0.0 - 1.2 mg/dL    Globulin 3.0 gm/dL    A/G Ratio 1.4 g/dL    BUN/Creatinine Ratio 17.1 7.0 - 25.0    Anion Gap 11.0 5.0 - 15.0 mmol/L    eGFR 91.0 >60.0 mL/min/1.73   High Sensitivity Troponin T    Collection Time: 12/18/24  9:30 AM    Specimen: Blood   Result Value Ref Range    HS Troponin T <6 <14 ng/L   Magnesium    Collection Time: 12/18/24  9:30 AM    Specimen: Blood   Result Value Ref Range    Magnesium 2.0 1.6 - 2.6 mg/dL   Green Top (Gel)    Collection Time: 12/18/24  9:30 AM   Result Value Ref Range    Extra Tube Hold for add-ons.    Lavender Top    Collection Time: 12/18/24  9:30 AM   Result Value Ref Range    Extra Tube hold for add-on    Gold Top - SST    Collection Time: 12/18/24  9:30 AM   Result Value Ref Range    Extra Tube Hold for add-ons.    Gray Top    Collection Time: 12/18/24  9:30 AM   Result Value Ref Range    Extra Tube Hold for add-ons.    Light Blue Top    Collection Time: 12/18/24  9:30 AM   Result Value Ref Range    Extra Tube Hold for add-ons.    CBC Auto Differential    Collection Time: 12/18/24  9:30 AM    Specimen: Blood    Result Value Ref Range    WBC 4.78 3.40 - 10.80 10*3/mm3    RBC 5.17 3.77 - 5.28 10*6/mm3    Hemoglobin 14.9 12.0 - 15.9 g/dL    Hematocrit 45.1 34.0 - 46.6 %    MCV 87.2 79.0 - 97.0 fL    MCH 28.8 26.6 - 33.0 pg    MCHC 33.0 31.5 - 35.7 g/dL    RDW 14.6 12.3 - 15.4 %    RDW-SD 47.0 37.0 - 54.0 fl    MPV 9.9 6.0 - 12.0 fL    Platelets 215 140 - 450 10*3/mm3    Neutrophil % 50.1 42.7 - 76.0 %    Lymphocyte % 39.5 19.6 - 45.3 %    Monocyte % 8.6 5.0 - 12.0 %    Eosinophil % 1.0 0.3 - 6.2 %    Basophil % 0.6 0.0 - 1.5 %    Immature Grans % 0.2 0.0 - 0.5 %    Neutrophils, Absolute 2.39 1.70 - 7.00 10*3/mm3    Lymphocytes, Absolute 1.89 0.70 - 3.10 10*3/mm3    Monocytes, Absolute 0.41 0.10 - 0.90 10*3/mm3    Eosinophils, Absolute 0.05 0.00 - 0.40 10*3/mm3    Basophils, Absolute 0.03 0.00 - 0.20 10*3/mm3    Immature Grans, Absolute 0.01 0.00 - 0.05 10*3/mm3    nRBC 0.0 0.0 - 0.2 /100 WBC   Urinalysis With Microscopic If Indicated (No Culture) - Urine, Clean Catch    Collection Time: 12/18/24  9:45 AM    Specimen: Urine, Clean Catch   Result Value Ref Range    Color, UA Yellow Yellow, Straw    Appearance, UA Cloudy (A) Clear    pH, UA 7.5 5.0 - 8.0    Specific Gravity, UA 1.020 1.001 - 1.030    Glucose, UA Negative Negative    Ketones, UA Negative Negative    Bilirubin, UA Negative Negative    Blood, UA Negative Negative    Protein, UA Trace (A) Negative    Leuk Esterase, UA Small (1+) (A) Negative    Nitrite, UA Negative Negative    Urobilinogen, UA 0.2 E.U./dL 0.2 - 1.0 E.U./dL   Urinalysis, Microscopic Only - Urine, Clean Catch    Collection Time: 12/18/24  9:45 AM    Specimen: Urine, Clean Catch   Result Value Ref Range    RBC, UA 0-2 None Seen, 0-2 /HPF    WBC, UA 0-2 None Seen, 0-2 /HPF    Bacteria, UA None Seen None Seen, Trace /HPF    Squamous Epithelial Cells, UA 3-6 (A) None Seen, 0-2 /HPF    Hyaline Casts, UA 0-6 0 - 6 /LPF    Methodology Automated Microscopy    High Sensitivity Troponin T 1Hr    Collection  Time: 12/18/24 10:49 AM    Specimen: Blood   Result Value Ref Range    HS Troponin T <6 <14 ng/L    Troponin T Numeric Delta       Note: In addition to lab results from this visit, the labs listed above may include labs taken at another facility or during a different encounter within the last 24 hours. Please correlate lab times with ED admission and discharge times for further clarification of the services performed during this visit.    XR Chest 1 View   Final Result   Impression:   No acute cardiopulmonary abnormality is identified.         Electronically Signed: Sugar Cruz     12/18/2024 10:35 AM EST     Workstation ID: [id number]        Vitals:    12/18/24 1030 12/18/24 1100 12/18/24 1130 12/18/24 1200   BP: 146/61 115/61 117/70 126/62   BP Location:       Patient Position:       Pulse: 53 61 52 54   Resp:       Temp:       TempSrc:       SpO2: 98% 92% 98% 97%   Weight:       Height:         Medications   dexAMETHasone (DECADRON) injection 6 mg (6 mg Intravenous Given 12/18/24 1050)   diazePAM (VALIUM) injection 2.5 mg (2.5 mg Intravenous Given 12/18/24 1048)   meclizine (ANTIVERT) tablet 25 mg (25 mg Oral Given 12/18/24 1053)     ECG/EMG Results (last 24 hours)       Procedure Component Value Units Date/Time    ECG 12 Lead ED Triage Standing Order; Weak / Dizzy / AMS [[id number]] Collected: 12/18/24 0915     Updated: 12/18/24 0949     QT Interval 408 ms      QTC Interval 417 ms     Narrative:      Test Reason : ED Triage Standing Order~  Blood Pressure :   */*   mmHG  Vent. Rate :  63 BPM     Atrial Rate :  63 BPM     P-R Int : 182 ms          QRS Dur :  88 ms      QT Int : 408 ms       P-R-T Axes :  63  14  21 degrees    QTcB Int : 417 ms    Normal sinus rhythm  Normal ECG  No previous ECGs available  Confirmed by PINA GARCIA MD ([id number]) on 12/18/2024 9:49:10 AM    Referred By: CAT           Confirmed By: PINA GARCIA MD          ECG 12 Lead ED Triage Standing Order; Weak / Dizzy / AMS   Final Result    Test Reason : ED Triage Standing Order~   Blood Pressure :   */*   mmHG   Vent. Rate :  63 BPM     Atrial Rate :  63 BPM      P-R Int : 182 ms          QRS Dur :  88 ms       QT Int : 408 ms       P-R-T Axes :  63  14  21 degrees     QTcB Int : 417 ms      Normal sinus rhythm   Normal ECG   No previous ECGs available   Confirmed by PINA GARCIA MD ([id number]) on 12/18/2024 9:49:10 AM      Referred By: CAT           Confirmed By: PINA GARCIA MD                      Medical Decision Making      I have reviewed all available studies at the bedside with the patient and her .  Her labs and EKG chest x-ray are all bland I personally reviewed all of these.    On recheck after treatment and period of observation I was able to get the patient up on the side of the bed to stand with minimal difficulty.  She is much improved she had no nausea or vomiting with this.  Her gait was fairly steady.    Impression I suspect the patient has peripheral vertigo.  She also has tinnitus which is chronic probably getting a little worse and so something like Ménière's disease might be in the differential as well.    This point and when I discharged her home talked at length about restrictions no driving or doing anything would put her at risk of falling to her dizziness is improved.  I will refer to ENT for follow-up she may need follow-up as well to learn Epley maneuvers.  Assured her she had almost inevitably have a recurrence of her current symptoms.    We treated with little meclizine for use at home.    She will return to the emergency department if worse in any way.    All are agreeable with the plan    Problems Addressed:  Tinnitus aurium, bilateral: chronic illness or injury  Vertigo: complicated acute illness or injury with systemic symptoms that poses a threat to life or bodily functions    Amount and/or Complexity of Data Reviewed  Independent Historian: spouse  External Data Reviewed: notes.  Labs: ordered.  Decision-making details documented in ED Course.  Radiology: ordered and independent interpretation performed. Decision-making details documented in ED Course.  ECG/medicine tests: ordered and independent interpretation performed. Decision-making details documented in ED Course.    Risk  Prescription drug management.  Parenteral controlled substances.  Drug therapy requiring intensive monitoring for toxicity.        Final diagnoses:   Vertigo   Tinnitus aurium, bilateral       ED Disposition  ED Disposition       ED Disposition   Discharge    Condition   Stable    Comment   --               Mary Carmen Ortiz,   [address]  [place] [id number]  [phone]      As needed    Cameron Mark MD  [address] [facility] [facility]    [place] [address]  [phone]    Schedule an appointment as soon as possible for a visit            Medication List        New Prescriptions      meclizine 25 MG tablet  Commonly known as: ANTIVERT  Take 1 tablet by mouth 3 (Three) Times a Day As Needed for Dizziness.               Where to Get Your Medications        These medications were sent to [place] [address] [facility] - [phone]  - [phone] [place] [address] [facility] [address]      Phone: [phone]   meclizine 25 MG tablet            Carlos Alberto Mcgee MD  12/18/24 6252

## 2024-12-18 NOTE — Clinical Note
[facility] EMERGENCY DEPARTMENT  [address]  [facility] [address]  Phone: [phone]    Merry Weller was seen and treated in our emergency department on 12/18/2024.  She may return to work on 12/23/2024.         Thank you for choosing [facility].    Carlos Alberto Mcgee MD

## 2025-05-12 ENCOUNTER — TRANSCRIBE ORDERS (OUTPATIENT)
Dept: ADMINISTRATIVE | Facility: HOSPITAL | Age: 60
End: 2025-05-12
Payer: COMMERCIAL

## 2025-05-12 DIAGNOSIS — Z12.31 VISIT FOR SCREENING MAMMOGRAM: Primary | ICD-10-CM

## 2025-06-18 ENCOUNTER — RESULTS FOLLOW-UP (OUTPATIENT)
Facility: HOSPITAL | Age: 60
End: 2025-06-18
Payer: COMMERCIAL

## 2025-06-18 LAB
NCCN CRITERIA FLAG: ABNORMAL
TYRER CUZICK SCORE: 12.7

## 2025-06-18 NOTE — PROGRESS NOTES
I sent the patient a [employer] message asking for a call to discuss assessment results.

## 2025-06-19 ENCOUNTER — HOSPITAL ENCOUNTER (OUTPATIENT)
Dept: MAMMOGRAPHY | Facility: HOSPITAL | Age: 60
Discharge: HOME | End: 2025-06-19
Admitting: OBSTETRICS & GYNECOLOGY
Payer: COMMERCIAL

## 2025-06-19 DIAGNOSIS — Z12.31 VISIT FOR SCREENING MAMMOGRAM: ICD-10-CM

## 2025-06-19 PROCEDURE — 77063 BREAST TOMOSYNTHESIS BI: CPT

## 2025-06-19 PROCEDURE — 77067 SCR MAMMO BI INCL CAD: CPT

## 2025-06-30 NOTE — PROGRESS NOTES
I left a voice mail and previously sent a [employer] message requesting a return call to discuss risk assessment results.

## 2025-07-03 ENCOUNTER — DOCUMENTATION (OUTPATIENT)
Dept: GENETICS | Facility: HOSPITAL | Age: 60
End: 2025-07-03
Payer: COMMERCIAL

## 2025-07-03 NOTE — PROGRESS NOTES
This patient recently completed the CARE risk assessment for a mammogram appointment. Based on the patient's responses, NCCN criteria for genetic testing was met. At the time of the assessment, the patient was provided with both written and video educational materials regarding genetic testing.    Navigator follow-up:   I have attempted to contact the patient via [employer] message and phone call to discuss the risk assessment results. The patient has not yet responded. My contact information was included in both the [employer] message and voicemail.

## 2025-08-14 ENCOUNTER — OFFICE VISIT (OUTPATIENT)
Dept: FAMILY MEDICINE CLINIC | Facility: CLINIC | Age: 60
End: 2025-08-14
Payer: COMMERCIAL

## 2025-08-14 ENCOUNTER — LAB (OUTPATIENT)
Dept: LAB | Facility: HOSPITAL | Age: 60
End: 2025-08-14
Payer: COMMERCIAL

## 2025-08-14 VITALS
HEART RATE: 63 BPM | WEIGHT: 148 LBS | SYSTOLIC BLOOD PRESSURE: 120 MMHG | DIASTOLIC BLOOD PRESSURE: 70 MMHG | OXYGEN SATURATION: 98 % | RESPIRATION RATE: 20 BRPM | BODY MASS INDEX: 27.23 KG/M2 | TEMPERATURE: 98.6 F | HEIGHT: 62 IN

## 2025-08-14 DIAGNOSIS — Z00.00 WELLNESS EXAMINATION: Primary | ICD-10-CM

## 2025-08-14 DIAGNOSIS — M25.512 LEFT SHOULDER PAIN, UNSPECIFIED CHRONICITY: ICD-10-CM

## 2025-08-14 DIAGNOSIS — Z00.00 WELLNESS EXAMINATION: ICD-10-CM

## 2025-08-14 LAB
25(OH)D3 SERPL-MCNC: 66.6 NG/ML (ref 30–100)
ALBUMIN SERPL-MCNC: 4 G/DL (ref 3.5–5.2)
ALBUMIN/GLOB SERPL: 1.4 G/DL
ALP SERPL-CCNC: 105 U/L (ref 39–117)
ALT SERPL W P-5'-P-CCNC: 17 U/L (ref 1–33)
ANION GAP SERPL CALCULATED.3IONS-SCNC: 11.1 MMOL/L (ref 5–15)
AST SERPL-CCNC: 24 U/L (ref 1–32)
BILIRUB SERPL-MCNC: 0.3 MG/DL (ref 0–1.2)
BUN SERPL-MCNC: 12 MG/DL (ref 6–20)
BUN/CREAT SERPL: 14 (ref 7–25)
CALCIUM SPEC-SCNC: 9.8 MG/DL (ref 8.6–10.5)
CHLORIDE SERPL-SCNC: 104 MMOL/L (ref 98–107)
CHOLEST SERPL-MCNC: 183 MG/DL (ref 0–200)
CO2 SERPL-SCNC: 25.9 MMOL/L (ref 22–29)
CREAT SERPL-MCNC: 0.86 MG/DL (ref 0.57–1)
DEPRECATED RDW RBC AUTO: 45.9 FL (ref 37–54)
EGFRCR SERPLBLD CKD-EPI 2021: 78.4 ML/MIN/1.73
ERYTHROCYTE [DISTWIDTH] IN BLOOD BY AUTOMATED COUNT: 15 % (ref 12.3–15.4)
GLOBULIN UR ELPH-MCNC: 2.9 GM/DL
GLUCOSE SERPL-MCNC: 77 MG/DL (ref 65–99)
HBA1C MFR BLD: 5.6 % (ref 4.8–5.6)
HCT VFR BLD AUTO: 41.7 % (ref 34–46.6)
HDLC SERPL-MCNC: 50 MG/DL (ref 40–60)
HGB BLD-MCNC: 13.6 G/DL (ref 12–15.9)
LDLC SERPL CALC-MCNC: 118 MG/DL (ref 0–100)
LDLC/HDLC SERPL: 2.33 {RATIO}
MCH RBC QN AUTO: 27.8 PG (ref 26.6–33)
MCHC RBC AUTO-ENTMCNC: 32.6 G/DL (ref 31.5–35.7)
MCV RBC AUTO: 85.3 FL (ref 79–97)
PLATELET # BLD AUTO: 227 10*3/MM3 (ref 140–450)
PMV BLD AUTO: 10.5 FL (ref 6–12)
POTASSIUM SERPL-SCNC: 4.3 MMOL/L (ref 3.5–5.2)
PROT SERPL-MCNC: 6.9 G/DL (ref 6–8.5)
RBC # BLD AUTO: 4.89 10*6/MM3 (ref 3.77–5.28)
SODIUM SERPL-SCNC: 141 MMOL/L (ref 136–145)
TRIGL SERPL-MCNC: 83 MG/DL (ref 0–150)
TSH SERPL DL<=0.05 MIU/L-ACNC: 2.45 UIU/ML (ref 0.27–4.2)
VIT B12 BLD-MCNC: 788 PG/ML (ref 211–946)
VLDLC SERPL-MCNC: 15 MG/DL (ref 5–40)
WBC NRBC COR # BLD AUTO: 3.68 10*3/MM3 (ref 3.4–10.8)

## 2025-08-14 PROCEDURE — 80061 LIPID PANEL: CPT

## 2025-08-14 PROCEDURE — 83036 HEMOGLOBIN GLYCOSYLATED A1C: CPT

## 2025-08-14 PROCEDURE — 82306 VITAMIN D 25 HYDROXY: CPT

## 2025-08-14 PROCEDURE — 80050 GENERAL HEALTH PANEL: CPT

## 2025-08-14 PROCEDURE — 82607 VITAMIN B-12: CPT
